# Patient Record
Sex: FEMALE | Race: WHITE | Employment: UNEMPLOYED | URBAN - METROPOLITAN AREA
[De-identification: names, ages, dates, MRNs, and addresses within clinical notes are randomized per-mention and may not be internally consistent; named-entity substitution may affect disease eponyms.]

---

## 2017-06-30 ENCOUNTER — OFFICE VISIT (OUTPATIENT)
Dept: FAMILY MEDICINE CLINIC | Age: 62
End: 2017-06-30

## 2017-06-30 VITALS
HEIGHT: 62 IN | BODY MASS INDEX: 25.76 KG/M2 | WEIGHT: 140 LBS | DIASTOLIC BLOOD PRESSURE: 84 MMHG | TEMPERATURE: 98 F | SYSTOLIC BLOOD PRESSURE: 126 MMHG

## 2017-06-30 DIAGNOSIS — R05.9 COUGH: ICD-10-CM

## 2017-06-30 DIAGNOSIS — R09.81 SINUS CONGESTION: Primary | ICD-10-CM

## 2017-06-30 PROCEDURE — 99213 OFFICE O/P EST LOW 20 MIN: CPT | Performed by: FAMILY MEDICINE

## 2017-06-30 RX ORDER — CEFDINIR 300 MG/1
300 CAPSULE ORAL 2 TIMES DAILY
Qty: 20 CAPSULE | Refills: 0 | Status: SHIPPED | OUTPATIENT
Start: 2017-06-30 | End: 2017-07-10

## 2017-09-26 ENCOUNTER — TELEPHONE (OUTPATIENT)
Dept: FAMILY MEDICINE CLINIC | Age: 62
End: 2017-09-26

## 2017-09-26 DIAGNOSIS — Z13.220 SCREENING, LIPID: Primary | ICD-10-CM

## 2017-09-26 DIAGNOSIS — Z13.1 SCREENING FOR DIABETES MELLITUS (DM): ICD-10-CM

## 2017-09-28 DIAGNOSIS — Z13.1 SCREENING FOR DIABETES MELLITUS (DM): ICD-10-CM

## 2017-09-28 DIAGNOSIS — Z13.220 SCREENING, LIPID: ICD-10-CM

## 2017-09-28 LAB
CHOLESTEROL, TOTAL: 178 MG/DL (ref 0–199)
GLUCOSE BLD-MCNC: 82 MG/DL (ref 70–99)
HDLC SERPL-MCNC: 71 MG/DL (ref 40–60)
LDL CHOLESTEROL CALCULATED: 95 MG/DL
TRIGL SERPL-MCNC: 58 MG/DL (ref 0–150)
VLDLC SERPL CALC-MCNC: 12 MG/DL

## 2017-10-10 ENCOUNTER — OFFICE VISIT (OUTPATIENT)
Dept: FAMILY MEDICINE CLINIC | Age: 62
End: 2017-10-10

## 2017-10-10 VITALS
BODY MASS INDEX: 25.1 KG/M2 | DIASTOLIC BLOOD PRESSURE: 86 MMHG | WEIGHT: 136.4 LBS | HEART RATE: 68 BPM | SYSTOLIC BLOOD PRESSURE: 128 MMHG | HEIGHT: 62 IN | TEMPERATURE: 98.4 F

## 2017-10-10 DIAGNOSIS — J45.909 UNCOMPLICATED ASTHMA, UNSPECIFIED ASTHMA SEVERITY: ICD-10-CM

## 2017-10-10 DIAGNOSIS — Z00.00 ROUTINE GENERAL MEDICAL EXAMINATION AT A HEALTH CARE FACILITY: Primary | ICD-10-CM

## 2017-10-10 DIAGNOSIS — E03.9 ACQUIRED HYPOTHYROIDISM: ICD-10-CM

## 2017-10-10 LAB — TSH REFLEX: 1.54 UIU/ML (ref 0.27–4.2)

## 2017-10-10 PROCEDURE — 99396 PREV VISIT EST AGE 40-64: CPT | Performed by: FAMILY MEDICINE

## 2017-10-10 RX ORDER — ALBUTEROL SULFATE 90 UG/1
2 AEROSOL, METERED RESPIRATORY (INHALATION) EVERY 6 HOURS PRN
Qty: 1 INHALER | Refills: 11 | Status: SHIPPED | OUTPATIENT
Start: 2017-10-10 | End: 2020-07-29 | Stop reason: SDUPTHER

## 2017-10-10 RX ORDER — MONTELUKAST SODIUM 10 MG/1
TABLET ORAL
Qty: 90 TABLET | Refills: 3 | Status: SHIPPED | OUTPATIENT
Start: 2017-10-10 | End: 2018-10-09 | Stop reason: SDUPTHER

## 2017-10-10 RX ORDER — FLUTICASONE PROPIONATE 50 MCG
1 SPRAY, SUSPENSION (ML) NASAL DAILY
Qty: 1 BOTTLE | Refills: 11 | Status: SHIPPED | OUTPATIENT
Start: 2017-10-10

## 2017-10-10 RX ORDER — LEVOTHYROXINE SODIUM 0.05 MG/1
TABLET ORAL
Qty: 90 TABLET | Refills: 3 | Status: SHIPPED | OUTPATIENT
Start: 2017-10-10 | End: 2018-10-09 | Stop reason: SDUPTHER

## 2017-10-10 ASSESSMENT — ENCOUNTER SYMPTOMS
NAUSEA: 0
COUGH: 0
EYE DISCHARGE: 0
EYE PAIN: 0
VOMITING: 0
DIARRHEA: 0
SHORTNESS OF BREATH: 0
EYE REDNESS: 0
ABDOMINAL PAIN: 0
SINUS PRESSURE: 0
CHEST TIGHTNESS: 0
CONSTIPATION: 0
RHINORRHEA: 0
BLOOD IN STOOL: 0
WHEEZING: 0

## 2017-10-10 ASSESSMENT — PATIENT HEALTH QUESTIONNAIRE - PHQ9
SUM OF ALL RESPONSES TO PHQ QUESTIONS 1-9: 0
2. FEELING DOWN, DEPRESSED OR HOPELESS: 0
SUM OF ALL RESPONSES TO PHQ9 QUESTIONS 1 & 2: 0
1. LITTLE INTEREST OR PLEASURE IN DOING THINGS: 0

## 2017-10-10 NOTE — PROGRESS NOTES
with Reflex     Standing Status:   Future     Standing Expiration Date:   10/10/2018     Declined hiv screen, flu shot and shingles vaccine

## 2017-10-10 NOTE — PATIENT INSTRUCTIONS
Please call your pharmacy if you need any refills of your medication(s). Please call our office at [phone] if you don't hear from us about your test results. Bring an accurate list of your medications with you at every appointment to ensure that we have the correct information. Our office hours are: Monday - Friday 7 am- 5 pm; Saturdays vary on doctor working.     Phone lines turn on at 8 am.

## 2017-10-20 ENCOUNTER — TELEPHONE (OUTPATIENT)
Dept: FAMILY MEDICINE CLINIC | Age: 62
End: 2017-10-20

## 2017-10-31 ENCOUNTER — HOSPITAL ENCOUNTER (OUTPATIENT)
Dept: MAMMOGRAPHY | Age: 62
Discharge: HOME | End: 2017-10-31
Attending: FAMILY MEDICINE | Admitting: FAMILY MEDICINE

## 2017-10-31 DIAGNOSIS — Z12.31 ENCOUNTER FOR SCREENING MAMMOGRAM FOR BREAST CANCER: ICD-10-CM

## 2018-04-18 ENCOUNTER — TELEPHONE (OUTPATIENT)
Dept: FAMILY MEDICINE CLINIC | Age: 63
End: 2018-04-18

## 2018-04-18 ENCOUNTER — OFFICE VISIT (OUTPATIENT)
Dept: FAMILY MEDICINE CLINIC | Age: 63
End: 2018-04-18

## 2018-04-18 VITALS
DIASTOLIC BLOOD PRESSURE: 80 MMHG | BODY MASS INDEX: 24.84 KG/M2 | HEIGHT: 62 IN | TEMPERATURE: 98.1 F | WEIGHT: 135 LBS | SYSTOLIC BLOOD PRESSURE: 126 MMHG

## 2018-04-18 DIAGNOSIS — J01.90 ACUTE NON-RECURRENT SINUSITIS, UNSPECIFIED LOCATION: Primary | ICD-10-CM

## 2018-04-18 DIAGNOSIS — J33.9 NASAL POLYP: ICD-10-CM

## 2018-04-18 PROCEDURE — 1036F TOBACCO NON-USER: CPT | Performed by: FAMILY MEDICINE

## 2018-04-18 PROCEDURE — 99213 OFFICE O/P EST LOW 20 MIN: CPT | Performed by: FAMILY MEDICINE

## 2018-04-18 PROCEDURE — G8420 CALC BMI NORM PARAMETERS: HCPCS | Performed by: FAMILY MEDICINE

## 2018-04-18 PROCEDURE — G8427 DOCREV CUR MEDS BY ELIG CLIN: HCPCS | Performed by: FAMILY MEDICINE

## 2018-04-18 PROCEDURE — 3017F COLORECTAL CA SCREEN DOC REV: CPT | Performed by: FAMILY MEDICINE

## 2018-04-18 PROCEDURE — 3014F SCREEN MAMMO DOC REV: CPT | Performed by: FAMILY MEDICINE

## 2018-04-18 RX ORDER — CEFUROXIME AXETIL 250 MG/1
250 TABLET ORAL 2 TIMES DAILY
Qty: 20 TABLET | Refills: 0 | Status: SHIPPED | OUTPATIENT
Start: 2018-04-18 | End: 2018-04-28

## 2018-04-18 RX ORDER — AMOXICILLIN AND CLAVULANATE POTASSIUM 500; 125 MG/1; MG/1
1 TABLET, FILM COATED ORAL 2 TIMES DAILY
Qty: 20 TABLET | Refills: 0 | Status: SHIPPED | OUTPATIENT
Start: 2018-04-18 | End: 2018-04-18

## 2018-10-09 RX ORDER — MONTELUKAST SODIUM 10 MG/1
TABLET ORAL
Qty: 90 TABLET | Refills: 0 | Status: SHIPPED | OUTPATIENT
Start: 2018-10-09 | End: 2019-01-21 | Stop reason: SDUPTHER

## 2018-10-09 RX ORDER — LEVOTHYROXINE SODIUM 0.05 MG/1
TABLET ORAL
Qty: 90 TABLET | Refills: 0 | Status: SHIPPED | OUTPATIENT
Start: 2018-10-09 | End: 2019-01-21 | Stop reason: SDUPTHER

## 2018-12-12 ENCOUNTER — HOSPITAL ENCOUNTER (OUTPATIENT)
Dept: WOMENS IMAGING | Age: 63
Discharge: HOME | End: 2018-12-12
Payer: COMMERCIAL

## 2018-12-12 DIAGNOSIS — Z12.39 BREAST CANCER SCREENING: ICD-10-CM

## 2018-12-12 PROCEDURE — 77067 SCR MAMMO BI INCL CAD: CPT

## 2019-01-17 RX ORDER — LEVOTHYROXINE SODIUM 0.05 MG/1
TABLET ORAL
Qty: 90 TABLET | Refills: 0 | OUTPATIENT
Start: 2019-01-17

## 2019-01-17 RX ORDER — MONTELUKAST SODIUM 10 MG/1
TABLET ORAL
Qty: 90 TABLET | Refills: 0 | OUTPATIENT
Start: 2019-01-17

## 2019-01-21 ENCOUNTER — OFFICE VISIT (OUTPATIENT)
Dept: FAMILY MEDICINE CLINIC | Age: 64
End: 2019-01-21
Payer: COMMERCIAL

## 2019-01-21 VITALS
HEART RATE: 68 BPM | SYSTOLIC BLOOD PRESSURE: 122 MMHG | HEIGHT: 62 IN | TEMPERATURE: 97.5 F | DIASTOLIC BLOOD PRESSURE: 74 MMHG | WEIGHT: 139.6 LBS | BODY MASS INDEX: 25.69 KG/M2

## 2019-01-21 DIAGNOSIS — E03.9 ACQUIRED HYPOTHYROIDISM: ICD-10-CM

## 2019-01-21 DIAGNOSIS — E03.9 ACQUIRED HYPOTHYROIDISM: Primary | ICD-10-CM

## 2019-01-21 LAB
T4 FREE: 1.2 NG/DL (ref 0.9–1.8)
TSH SERPL DL<=0.05 MIU/L-ACNC: 1.88 UIU/ML (ref 0.27–4.2)

## 2019-01-21 PROCEDURE — 99213 OFFICE O/P EST LOW 20 MIN: CPT | Performed by: FAMILY MEDICINE

## 2019-01-21 PROCEDURE — 1036F TOBACCO NON-USER: CPT | Performed by: FAMILY MEDICINE

## 2019-01-21 PROCEDURE — 3017F COLORECTAL CA SCREEN DOC REV: CPT | Performed by: FAMILY MEDICINE

## 2019-01-21 PROCEDURE — G8427 DOCREV CUR MEDS BY ELIG CLIN: HCPCS | Performed by: FAMILY MEDICINE

## 2019-01-21 PROCEDURE — G8484 FLU IMMUNIZE NO ADMIN: HCPCS | Performed by: FAMILY MEDICINE

## 2019-01-21 PROCEDURE — G8419 CALC BMI OUT NRM PARAM NOF/U: HCPCS | Performed by: FAMILY MEDICINE

## 2019-01-21 RX ORDER — MONTELUKAST SODIUM 10 MG/1
10 TABLET ORAL NIGHTLY
Qty: 90 TABLET | Refills: 2 | Status: SHIPPED | OUTPATIENT
Start: 2019-01-21 | End: 2019-11-30 | Stop reason: SDUPTHER

## 2019-01-21 RX ORDER — LEVOTHYROXINE SODIUM 0.05 MG/1
50 TABLET ORAL DAILY
Qty: 90 TABLET | Refills: 2 | Status: SHIPPED | OUTPATIENT
Start: 2019-01-21 | End: 2019-10-09 | Stop reason: SDUPTHER

## 2019-01-21 ASSESSMENT — ENCOUNTER SYMPTOMS
CONSTIPATION: 0
NAUSEA: 0
ABDOMINAL PAIN: 0
VOMITING: 0
DIARRHEA: 0
BLOOD IN STOOL: 0
ANAL BLEEDING: 0
COUGH: 0
SHORTNESS OF BREATH: 0
TROUBLE SWALLOWING: 0
SORE THROAT: 0
VOICE CHANGE: 0

## 2019-01-21 ASSESSMENT — PATIENT HEALTH QUESTIONNAIRE - PHQ9
2. FEELING DOWN, DEPRESSED OR HOPELESS: 0
SUM OF ALL RESPONSES TO PHQ QUESTIONS 1-9: 0
SUM OF ALL RESPONSES TO PHQ9 QUESTIONS 1 & 2: 0
SUM OF ALL RESPONSES TO PHQ QUESTIONS 1-9: 0
1. LITTLE INTEREST OR PLEASURE IN DOING THINGS: 0

## 2019-10-10 RX ORDER — LEVOTHYROXINE SODIUM 0.05 MG/1
TABLET ORAL
Qty: 90 TABLET | Refills: 1 | Status: SHIPPED | OUTPATIENT
Start: 2019-10-10 | End: 2020-04-03

## 2020-04-03 RX ORDER — LEVOTHYROXINE SODIUM 0.05 MG/1
TABLET ORAL
Qty: 30 TABLET | Refills: 0 | Status: SHIPPED | OUTPATIENT
Start: 2020-04-03 | End: 2020-05-04

## 2020-05-04 RX ORDER — LEVOTHYROXINE SODIUM 0.05 MG/1
TABLET ORAL
Qty: 30 TABLET | Refills: 0 | Status: SHIPPED | OUTPATIENT
Start: 2020-05-04 | End: 2020-06-10

## 2020-06-01 RX ORDER — MONTELUKAST SODIUM 10 MG/1
TABLET ORAL
Qty: 90 TABLET | Refills: 0 | Status: SHIPPED | OUTPATIENT
Start: 2020-06-01 | End: 2020-07-29 | Stop reason: SDUPTHER

## 2020-06-04 RX ORDER — LEVOTHYROXINE SODIUM 0.05 MG/1
TABLET ORAL
Qty: 30 TABLET | Refills: 0 | OUTPATIENT
Start: 2020-06-04

## 2020-06-29 ENCOUNTER — TELEPHONE (OUTPATIENT)
Dept: FAMILY MEDICINE CLINIC | Age: 65
End: 2020-06-29

## 2020-06-30 NOTE — TELEPHONE ENCOUNTER
Patient scheduled for cpe on 7/29 and asking to have labs drawn before appt. She is asking for full blood workup.

## 2020-07-08 RX ORDER — LEVOTHYROXINE SODIUM 0.05 MG/1
TABLET ORAL
Qty: 30 TABLET | Refills: 1 | Status: SHIPPED | OUTPATIENT
Start: 2020-07-08 | End: 2020-07-29 | Stop reason: SDUPTHER

## 2020-07-23 DIAGNOSIS — Q21.10 ASD (ATRIAL SEPTAL DEFECT): ICD-10-CM

## 2020-07-23 DIAGNOSIS — Z13.220 SCREENING CHOLESTEROL LEVEL: ICD-10-CM

## 2020-07-23 DIAGNOSIS — E03.9 ACQUIRED HYPOTHYROIDISM: ICD-10-CM

## 2020-07-23 DIAGNOSIS — R53.83 FATIGUE, UNSPECIFIED TYPE: ICD-10-CM

## 2020-07-23 LAB
A/G RATIO: 1.6 (ref 1.1–2.2)
ALBUMIN SERPL-MCNC: 4.1 G/DL (ref 3.4–5)
ALP BLD-CCNC: 63 U/L (ref 40–129)
ALT SERPL-CCNC: 18 U/L (ref 10–40)
ANION GAP SERPL CALCULATED.3IONS-SCNC: 13 MMOL/L (ref 3–16)
AST SERPL-CCNC: 27 U/L (ref 15–37)
BILIRUB SERPL-MCNC: 0.3 MG/DL (ref 0–1)
BUN BLDV-MCNC: 16 MG/DL (ref 7–20)
CALCIUM SERPL-MCNC: 9.9 MG/DL (ref 8.3–10.6)
CHLORIDE BLD-SCNC: 106 MMOL/L (ref 99–110)
CHOLESTEROL, TOTAL: 166 MG/DL (ref 0–199)
CO2: 25 MMOL/L (ref 21–32)
CREAT SERPL-MCNC: 0.7 MG/DL (ref 0.6–1.2)
GFR AFRICAN AMERICAN: >60
GFR NON-AFRICAN AMERICAN: >60
GLOBULIN: 2.5 G/DL
GLUCOSE BLD-MCNC: 86 MG/DL (ref 70–99)
HCT VFR BLD CALC: 40.1 % (ref 36–48)
HDLC SERPL-MCNC: 68 MG/DL (ref 40–60)
HEMOGLOBIN: 13.3 G/DL (ref 12–16)
LDL CHOLESTEROL CALCULATED: 87 MG/DL
MCH RBC QN AUTO: 30.4 PG (ref 26–34)
MCHC RBC AUTO-ENTMCNC: 33.1 G/DL (ref 31–36)
MCV RBC AUTO: 91.9 FL (ref 80–100)
PDW BLD-RTO: 14 % (ref 12.4–15.4)
PLATELET # BLD: 207 K/UL (ref 135–450)
PMV BLD AUTO: 9.7 FL (ref 5–10.5)
POTASSIUM SERPL-SCNC: 3.8 MMOL/L (ref 3.5–5.1)
RBC # BLD: 4.37 M/UL (ref 4–5.2)
SODIUM BLD-SCNC: 144 MMOL/L (ref 136–145)
TOTAL PROTEIN: 6.6 G/DL (ref 6.4–8.2)
TRIGL SERPL-MCNC: 57 MG/DL (ref 0–150)
TSH SERPL DL<=0.05 MIU/L-ACNC: 2.26 UIU/ML (ref 0.27–4.2)
VLDLC SERPL CALC-MCNC: 11 MG/DL
WBC # BLD: 5.1 K/UL (ref 4–11)

## 2020-07-29 ENCOUNTER — OFFICE VISIT (OUTPATIENT)
Dept: FAMILY MEDICINE CLINIC | Age: 65
End: 2020-07-29
Payer: COMMERCIAL

## 2020-07-29 VITALS
TEMPERATURE: 97.9 F | WEIGHT: 142.2 LBS | DIASTOLIC BLOOD PRESSURE: 80 MMHG | OXYGEN SATURATION: 98 % | SYSTOLIC BLOOD PRESSURE: 142 MMHG | HEIGHT: 58 IN | BODY MASS INDEX: 29.85 KG/M2 | HEART RATE: 66 BPM

## 2020-07-29 PROBLEM — Z86.79 H/O TACHYCARDIA-BRADYCARDIA SYNDROME: Status: ACTIVE | Noted: 2018-05-01

## 2020-07-29 PROBLEM — Z90.710 S/P HYSTERECTOMY: Status: ACTIVE | Noted: 2020-07-29

## 2020-07-29 PROBLEM — I48.92 ATRIAL FLUTTER (HCC): Status: ACTIVE | Noted: 2017-04-25

## 2020-07-29 PROBLEM — Z95.0 S/P CARDIAC PACEMAKER PROCEDURE: Status: RESOLVED | Noted: 2019-08-19 | Resolved: 2020-07-29

## 2020-07-29 PROBLEM — Z95.0 S/P CARDIAC PACEMAKER PROCEDURE: Status: ACTIVE | Noted: 2019-08-19

## 2020-07-29 PROBLEM — R94.31 ABNORMAL ECG: Status: ACTIVE | Noted: 2017-04-25

## 2020-07-29 PROBLEM — I49.5 SSS (SICK SINUS SYNDROME) (HCC): Status: ACTIVE | Noted: 2020-07-29

## 2020-07-29 PROCEDURE — 3017F COLORECTAL CA SCREEN DOC REV: CPT | Performed by: INTERNAL MEDICINE

## 2020-07-29 PROCEDURE — 1036F TOBACCO NON-USER: CPT | Performed by: INTERNAL MEDICINE

## 2020-07-29 PROCEDURE — 99204 OFFICE O/P NEW MOD 45 MIN: CPT | Performed by: INTERNAL MEDICINE

## 2020-07-29 PROCEDURE — G8427 DOCREV CUR MEDS BY ELIG CLIN: HCPCS | Performed by: INTERNAL MEDICINE

## 2020-07-29 PROCEDURE — G8419 CALC BMI OUT NRM PARAM NOF/U: HCPCS | Performed by: INTERNAL MEDICINE

## 2020-07-29 RX ORDER — MONTELUKAST SODIUM 10 MG/1
TABLET ORAL
Qty: 90 TABLET | Refills: 3 | Status: SHIPPED | OUTPATIENT
Start: 2020-07-29 | End: 2021-07-26

## 2020-07-29 RX ORDER — ALBUTEROL SULFATE 90 UG/1
2 AEROSOL, METERED RESPIRATORY (INHALATION) EVERY 6 HOURS PRN
Qty: 1 INHALER | Refills: 3 | Status: SHIPPED | OUTPATIENT
Start: 2020-07-29 | End: 2021-12-01 | Stop reason: SDUPTHER

## 2020-07-29 RX ORDER — NYSTATIN 500000 [USP'U]/1
500000 TABLET, COATED ORAL 2 TIMES DAILY
Qty: 60 TABLET | Refills: 1 | Status: SHIPPED | OUTPATIENT
Start: 2020-07-29 | End: 2022-02-22 | Stop reason: SDUPTHER

## 2020-07-29 RX ORDER — LEVOTHYROXINE SODIUM 0.05 MG/1
50 TABLET ORAL DAILY
Qty: 90 TABLET | Refills: 1 | Status: SHIPPED | OUTPATIENT
Start: 2020-07-29 | End: 2021-02-21

## 2020-07-29 ASSESSMENT — ENCOUNTER SYMPTOMS
BLOOD IN STOOL: 0
BACK PAIN: 1
NAUSEA: 0
DIARRHEA: 0
SHORTNESS OF BREATH: 0
VOMITING: 0
SINUS PRESSURE: 0
ABDOMINAL PAIN: 0
CONSTIPATION: 1
COUGH: 0

## 2020-07-29 ASSESSMENT — PATIENT HEALTH QUESTIONNAIRE - PHQ9
2. FEELING DOWN, DEPRESSED OR HOPELESS: 0
SUM OF ALL RESPONSES TO PHQ QUESTIONS 1-9: 0
SUM OF ALL RESPONSES TO PHQ QUESTIONS 1-9: 0
1. LITTLE INTEREST OR PLEASURE IN DOING THINGS: 0
SUM OF ALL RESPONSES TO PHQ9 QUESTIONS 1 & 2: 0

## 2020-07-29 NOTE — PROGRESS NOTES
by mouth 2 times daily. No current facility-administered medications for this visit.           Social History     Socioeconomic History    Marital status:      Spouse name: Not on file    Number of children: Not on file    Years of education: Not on file    Highest education level: Not on file   Occupational History    Not on file   Social Needs    Financial resource strain: Not on file    Food insecurity     Worry: Not on file     Inability: Not on file    Transportation needs     Medical: Not on file     Non-medical: Not on file   Tobacco Use    Smoking status: Former Smoker     Packs/day: 0.50     Years: 8.00     Pack years: 4.00     Types: Cigarettes     Start date: 1972     Last attempt to quit: 1980     Years since quittin.6    Smokeless tobacco: Never Used   Substance and Sexual Activity    Alcohol use: Yes     Comment: rare, occ glass wine    Drug use: Never    Sexual activity: Not Currently     Partners: Male   Lifestyle    Physical activity     Days per week: Not on file     Minutes per session: Not on file    Stress: Not on file   Relationships    Social connections     Talks on phone: Not on file     Gets together: Not on file     Attends Alevism service: Not on file     Active member of club or organization: Not on file     Attends meetings of clubs or organizations: Not on file     Relationship status: Not on file    Intimate partner violence     Fear of current or ex partner: Not on file     Emotionally abused: Not on file     Physically abused: Not on file     Forced sexual activity: Not on file   Other Topics Concern    Not on file   Social History Narrative    Not on file      Past Medical History:   Diagnosis Date    Asthma     Atrial septal defect     Constipation     Environmental allergies     Hyperlipidemia     borderline    Pacemaker     Reflux      Past Surgical History:   Procedure Laterality Date    CARDIAC SURGERY      atrial septal sleep disturbance. Negative for dysphoric mood. OBJECTIVE:  BP (!) 142/80   Pulse 66   Temp 97.9 °F (36.6 °C) (Temporal)   Ht 4' 9.75\" (1.467 m)   Wt 142 lb 3.2 oz (64.5 kg)   LMP  (LMP Unknown)   SpO2 98%   Breastfeeding No   BMI 29.98 kg/m²      Body mass index is 29.98 kg/m². Physical Exam  Constitutional:       General: She is not in acute distress. Appearance: Normal appearance. She is well-developed. She is not diaphoretic. HENT:      Head: Normocephalic and atraumatic. Right Ear: Tympanic membrane and ear canal normal.      Left Ear: Tympanic membrane and ear canal normal.      Nose: Nose normal.      Mouth/Throat:      Pharynx: Oropharynx is clear. Eyes:      Conjunctiva/sclera: Conjunctivae normal.   Neck:      Musculoskeletal: Neck supple. Muscular tenderness (Posterior) present. Thyroid: No thyroid mass or thyromegaly. Vascular: No carotid bruit. Cardiovascular:      Rate and Rhythm: Normal rate and regular rhythm. Heart sounds: Normal heart sounds. No murmur. No friction rub. No gallop. Pulmonary:      Effort: Pulmonary effort is normal. No tachypnea or accessory muscle usage. Breath sounds: Normal breath sounds. No decreased breath sounds. Chest:      Chest wall: No tenderness. Abdominal:      General: There is no distension. Palpations: Abdomen is soft. Tenderness: There is no abdominal tenderness. Comments: No hepatosplenomegaly   Musculoskeletal:         General: Tenderness (Across low back. No tenderness in her left groin or trochanteric hip area) present. No swelling. Lymphadenopathy:      Cervical: No cervical adenopathy. Upper Body:      Right upper body: No supraclavicular adenopathy. Left upper body: No supraclavicular adenopathy. Skin:     General: Skin is warm and dry. Findings: Rash (Circular lesions on hands) present. Neurological:      General: No focal deficit present.       Mental Status: She is alert. Gait: Gait normal.   Psychiatric:         Speech: Speech normal.         Behavior: Behavior normal.         Thought Content: Thought content normal.         ASSESSMENT/PLAN:    Dion Dean was seen today for annual exam.    Diagnoses and all orders for this visit:    Left groin pain  -     XR HIP 2-3 VW W PELVIS LEFT; Future    Sciatica, left side  -     XR LUMBAR SPINE (2-3 VIEWS); Future    Neck pain  -     XR CERVICAL SPINE (2-3 VIEWS); Future    Neuropathy  -     XR CERVICAL SPINE (2-3 VIEWS); Future    ASD (atrial septal defect)    Acquired hypothyroidism  -     levothyroxine (SYNTHROID) 50 MCG tablet; Take 1 tablet by mouth Daily    SSS (sick sinus syndrome) (HCC)    Mild intermittent asthma without complication  -     montelukast (SINGULAIR) 10 MG tablet; TAKE ONE TABLET BY MOUTH NIGHTLY  -     albuterol sulfate  (90 Base) MCG/ACT inhaler; Inhale 2 puffs into the lungs every 6 hours as needed for Wheezing or Shortness of Breath    Breast cancer screening  -     XOCHITL DIGITAL SCREEN W OR WO CAD BILATERAL; Future    Rash question fungal medicine helped previously. If continues to call  -     nystatin (MYCOSTATIN) 104856 units TABS; Take 1 tablet by mouth 2 times daily        Orders Placed This Encounter   Medications    nystatin (MYCOSTATIN) 262050 units TABS     Sig: Take 1 tablet by mouth 2 times daily     Dispense:  60 tablet     Refill:  1    montelukast (SINGULAIR) 10 MG tablet     Sig: TAKE ONE TABLET BY MOUTH NIGHTLY     Dispense:  90 tablet     Refill:  3    albuterol sulfate  (90 Base) MCG/ACT inhaler     Sig: Inhale 2 puffs into the lungs every 6 hours as needed for Wheezing or Shortness of Breath     Dispense:  1 Inhaler     Refill:  3    levothyroxine (SYNTHROID) 50 MCG tablet     Sig: Take 1 tablet by mouth Daily     Dispense:  90 tablet     Refill:  1        Return in about 1 year (around 7/29/2021), or if symptoms worsen or fail to improve.      There are no Patient Instructions on file for this visit.

## 2020-07-29 NOTE — LETTER
[employer] OF [facility]  [address] [facility] [address]  Phone: [phone]  Fax: [phone]    Jerome Guillaume MD        July 29, 2020     Patient: Yeni Rosa   YOB: 1955   Date of Visit: 7/29/2020       To Whom It May Concern: It is my medical opinion that Xander Rodríguez requires a disability parking placard for the following reasons:  She has limited walking ability due to an orthopedic condition. Duration of need: permanent    If you have any questions or concerns, please don't hesitate to call.     Sincerely,        Jerome Guillaume MD

## 2020-07-31 ENCOUNTER — HOSPITAL ENCOUNTER (OUTPATIENT)
Dept: GENERAL RADIOLOGY | Age: 65
Discharge: HOME | End: 2020-07-31
Payer: COMMERCIAL

## 2020-07-31 ENCOUNTER — HOSPITAL ENCOUNTER (OUTPATIENT)
Age: 65
Discharge: HOME | End: 2020-07-31
Payer: COMMERCIAL

## 2020-07-31 PROCEDURE — 72100 X-RAY EXAM L-S SPINE 2/3 VWS: CPT

## 2020-07-31 PROCEDURE — 72040 X-RAY EXAM NECK SPINE 2-3 VW: CPT

## 2020-07-31 PROCEDURE — 73502 X-RAY EXAM HIP UNI 2-3 VIEWS: CPT

## 2020-08-12 ENCOUNTER — OFFICE VISIT (OUTPATIENT)
Dept: ORTHOPEDIC SURGERY | Age: 65
End: 2020-08-12
Payer: COMMERCIAL

## 2020-08-12 VITALS — WEIGHT: 137 LBS | HEIGHT: 57 IN | TEMPERATURE: 97.7 F | BODY MASS INDEX: 29.56 KG/M2

## 2020-08-12 PROCEDURE — G8419 CALC BMI OUT NRM PARAM NOF/U: HCPCS | Performed by: PHYSICIAN ASSISTANT

## 2020-08-12 PROCEDURE — G8427 DOCREV CUR MEDS BY ELIG CLIN: HCPCS | Performed by: PHYSICIAN ASSISTANT

## 2020-08-12 PROCEDURE — 99204 OFFICE O/P NEW MOD 45 MIN: CPT | Performed by: PHYSICIAN ASSISTANT

## 2020-08-12 NOTE — PROGRESS NOTES
History of present illness:   Ms. Gordon Zuleta  is a pleasant 62 y.o. female with a PMH of right LISA, pacemaker, HLD, atrial septal defect, and asthma kindly referred by Dr. Hilary Yang for consultation regarding her neck, left arm, LBP, and left leg pain. She states her pain began insidiously about several years ago. Her pain has steadily increased since then. She rates her back pain 8/10 and leg pain 8/10. She describes the pain as aching, constant, and at times sharp that is worse with all positions and denies alleviating factors. The leg pain radiates down the posterior aspect of her left leg to her ankle. She also notes left hip and anterior groin pain. She admits numbness and tingling in her left leg. She admits generlized weakness of her left leg and denies bowel or bladder dysfunction. Established with urologist for botox injections for incontinence. She has tried multiple epidural injections in the distant past with good, temporary relief (9 years ago through [employer]). Has tried PT in distant past without relief. She takes Advil PM.    Also notes constant, aching neck pain along with left UE numbness and tingling. States her neck pain is worse with prolonged sitting and any cervical motion. Notes \"cracking\" in neck. Denies radiating arm pain. Numbness and tingling extends from her elbow into her left thumb and index finger. Denies loss of fine motor skills or gait disturbance. Has not had ESIs or PT for her neck. Past medical history:  Her past medical history has been reviewed and is significant for pacemaker, HLD, atrial septal defect, and asthma    Past surgical history:  Her past surgical history has been reviewed and is non-contributory to her present illness. Her medications and allergies were reviewed.     Social history:  Her social history has been reviewed and she is a former smoker quitting in 1980    Current Medication:  Current Outpatient Medications   Medication Sig Dispense Refill  Ibuprofen-diphenhydrAMINE Cit (ADVIL PM PO) Take by mouth nightly as needed (sleep)      nystatin (MYCOSTATIN) 331327 units TABS Take 1 tablet by mouth 2 times daily 60 tablet 1    montelukast (SINGULAIR) 10 MG tablet TAKE ONE TABLET BY MOUTH NIGHTLY 90 tablet 3    albuterol sulfate  (90 Base) MCG/ACT inhaler Inhale 2 puffs into the lungs every 6 hours as needed for Wheezing or Shortness of Breath 1 Inhaler 3    levothyroxine (SYNTHROID) 50 MCG tablet Take 1 tablet by mouth Daily 90 tablet 1    Ospemifene 60 MG TABS Take by mouth daily       estradiol (ESTRING) 2 MG vaginal ring Place 2 mg vaginally once Follow package directions.  fluticasone (FLONASE) 50 MCG/ACT nasal spray 1 spray by Nasal route daily 1 Bottle 11    Probiotic Product (PROBIOTIC DAILY PO) Take by mouth daily       Multiple Vitamins-Minerals (THERAPEUTIC MULTIVITAMIN-MINERALS) tablet Take 1 tablet by mouth daily.  Calcium Carbonate-Vitamin D (CALCIUM 600+D3 PO) Take 1 tablet by mouth 2 times daily       Biotin 2500 MCG CAPS Take  by mouth 2 times daily. No current facility-administered medications for this visit. Review of symptoms:  Patient's review of symptoms was reviewed and is significant for back pain and negative for recent weight loss, fatigue, chills, visual disturbances, blood in stool or urine, recent infection, chest pain, or shortness of breath. All other ROS were negative. Physical examination:  Ms. William Crawford's most recent vitals:  Vitals  Temp: 97.7 °F (36.5 °C)  Temp Source: Temporal  Height: 4' 9\" (144.8 cm)  Weight: 137 lb (62.1 kg)  Body mass index is 29.65 kg/m². General exam:  She is well-developed and well-nourished, is in obvious pain and alert and oriented to person, place, and time. She demonstrates appropriate mood and affect. Her skin is warm and dry. Her gait is normal and she walks heel to toe without limp or instability.      Back:  She stands with slight lumbar flexion. Her lumbar flexion, extension and lateral bending are moderately reduced with pain. She has mild tenderness over her lumbar spine without obvious muscle spasm. The skin over her lumbar spine is normal without a surgical scar. Lower extremities:  She has 5/5 motor strength of bilateral lower extremities. She has a negative straight leg raise, bilaterally. Deep tendon reflexes at knees and achilles are 2+. Sensation is intact to light touch L3 to S1 bilaterally. She has no clonus. Left hip ROM mild pain. Upper extremities:   She walks with a normal gait. Her cervical flexion, extension, and axial rotation are moderately reduced with pain. Her skin is warm and dry. Her has no tenderness over her cervical spine and no obvious muscle spasm. The skin over her cervical spine is normal without surgical scar. She has 5/5 strength of her interosseous muscles, wrist dorsiflexors and volarflexors, biceps, triceps, deltoids, and internal and external rotators of her shoulders, bilaterally. Her biceps, triceps, bracheoradialis, quadriceps and achilles reflexes are 2+, bilaterally. Sensation is intact to light touchfrom C6 to C8. She has no clonus and negative Souza's bilaterally. Imaging:  I reviewed AP and lateral xray images of her lumbar spine from 7/31/20. They note severe degenerative changes throughout without evidence of fracture or instability. Mild degenerative scoliosis. Reviewed AP and lateral xray images of her cervical spine from 7/31/20 which show severe degenerative disc changes, most notable C3-4, C4-5, and C6-7. Left greater than right facet arthropathy. No obvious sign of fracture. Left hip xrays from 7/31/20 note moderate to severe OA without fracture. Assessment:  Cervical DDD with radiculopathy  Lumbar DDD with radiculopathy   Left hip OA    Plan:  We discussed treatment options including observation, additional physical therapy, epidural injections and additional imaging. Patient unable to have MRI due to pacemaker, so will proceed with CT myelogram for her cervical and lumbar spine. Would like to have injections pending results. Would like to keep surgery as a last resort.    Advised patient to see hip specialist for her left groin pain and moderate-severe arthritis and she would like to address her back first.     Chaparro Dupont PA-C I will SWITCH the dose or number of times a day I take the medications listed below when I get home from the hospital:  None

## 2020-08-13 ENCOUNTER — TELEPHONE (OUTPATIENT)
Dept: ORTHOPEDIC SURGERY | Age: 65
End: 2020-08-13

## 2020-08-24 ENCOUNTER — TELEPHONE (OUTPATIENT)
Dept: ORTHOPEDIC SURGERY | Age: 65
End: 2020-08-24

## 2020-08-24 NOTE — TELEPHONE ENCOUNTER
Lab orders placed in chart and patient notified by leaving a VM asked patient to call back if she has any questions.

## 2020-09-01 DIAGNOSIS — M51.36 DDD (DEGENERATIVE DISC DISEASE), LUMBAR: ICD-10-CM

## 2020-09-01 DIAGNOSIS — M54.16 LUMBAR RADICULOPATHY: ICD-10-CM

## 2020-09-01 DIAGNOSIS — M54.12 CERVICAL RADICULOPATHY: ICD-10-CM

## 2020-09-01 DIAGNOSIS — M50.30 DEGENERATIVE DISC DISEASE, CERVICAL: ICD-10-CM

## 2020-09-01 LAB
BUN BLDV-MCNC: 22 MG/DL (ref 7–20)
CREAT SERPL-MCNC: 0.7 MG/DL (ref 0.6–1.2)
GFR AFRICAN AMERICAN: >60
GFR NON-AFRICAN AMERICAN: >60

## 2020-09-03 ENCOUNTER — OFFICE VISIT (OUTPATIENT)
Dept: PRIMARY CARE CLINIC | Age: 65
End: 2020-09-03
Payer: COMMERCIAL

## 2020-09-03 ENCOUNTER — TELEPHONE (OUTPATIENT)
Dept: ORTHOPEDIC SURGERY | Age: 65
End: 2020-09-03

## 2020-09-03 PROCEDURE — 99211 OFF/OP EST MAY X REQ PHY/QHP: CPT | Performed by: NURSE PRACTITIONER

## 2020-09-03 PROCEDURE — G8428 CUR MEDS NOT DOCUMENT: HCPCS | Performed by: NURSE PRACTITIONER

## 2020-09-03 PROCEDURE — G8419 CALC BMI OUT NRM PARAM NOF/U: HCPCS | Performed by: NURSE PRACTITIONER

## 2020-09-03 NOTE — PATIENT INSTRUCTIONS

## 2020-09-03 NOTE — TELEPHONE ENCOUNTER
Called and spoke with the patient she was told when she scheduled her CT that she would have to have a Coag test done. But was told two different times she needed to have it done. One before the exam and then the other was it to be done the same day. I have pended the orders and will call to tell the patient that she can go before hand or if she cant then they will do it the day of the exam.     Stefan Cruz could you sign these orders?

## 2020-09-03 NOTE — PROGRESS NOTES
Gaynel Daubs received a viral test for COVID-19. They were educated on isolation and quarantine as appropriate. For any symptoms, they were directed to seek care from their PCP, given contact information to establish with a doctor, directed to an urgent care or the emergency room.

## 2020-09-04 LAB — SARS-COV-2, NAA: NOT DETECTED

## 2020-09-09 ENCOUNTER — HOSPITAL ENCOUNTER (OUTPATIENT)
Dept: GENERAL RADIOLOGY | Age: 65
Discharge: HOME | End: 2020-09-09
Payer: COMMERCIAL

## 2020-09-09 ENCOUNTER — APPOINTMENT (OUTPATIENT)
Dept: GENERAL RADIOLOGY | Age: 65
End: 2020-09-09
Payer: COMMERCIAL

## 2020-09-09 ENCOUNTER — HOSPITAL ENCOUNTER (OUTPATIENT)
Dept: CT IMAGING | Age: 65
Discharge: HOME | End: 2020-09-09
Payer: COMMERCIAL

## 2020-09-09 ENCOUNTER — APPOINTMENT (OUTPATIENT)
Dept: CT IMAGING | Age: 65
End: 2020-09-09
Payer: COMMERCIAL

## 2020-09-09 VITALS
DIASTOLIC BLOOD PRESSURE: 90 MMHG | HEART RATE: 61 BPM | TEMPERATURE: 97 F | OXYGEN SATURATION: 98 % | RESPIRATION RATE: 16 BRPM | HEIGHT: 57 IN | BODY MASS INDEX: 30.63 KG/M2 | WEIGHT: 141.98 LBS | SYSTOLIC BLOOD PRESSURE: 142 MMHG

## 2020-09-09 LAB
APTT: 29.4 SEC (ref 24.2–36.2)
INR BLD: 0.9 (ref 0.86–1.14)
PROTHROMBIN TIME: 10.4 SEC (ref 10–13.2)

## 2020-09-09 PROCEDURE — 2709999900 FL MYELOGRAM CERVICAL S&I

## 2020-09-09 PROCEDURE — 7100000011 HC PHASE II RECOVERY - ADDTL 15 MIN

## 2020-09-09 PROCEDURE — 7100000010 HC PHASE II RECOVERY - FIRST 15 MIN

## 2020-09-09 PROCEDURE — 6360000004 HC RX CONTRAST MEDICATION: Performed by: PHYSICIAN ASSISTANT

## 2020-09-09 PROCEDURE — 72132 CT LUMBAR SPINE W/DYE: CPT

## 2020-09-09 PROCEDURE — 72126 CT NECK SPINE W/DYE: CPT

## 2020-09-09 PROCEDURE — 72265 MYELOGRAPHY L-S SPINE: CPT

## 2020-09-09 PROCEDURE — 85610 PROTHROMBIN TIME: CPT

## 2020-09-09 PROCEDURE — 36415 COLL VENOUS BLD VENIPUNCTURE: CPT

## 2020-09-09 PROCEDURE — 85730 THROMBOPLASTIN TIME PARTIAL: CPT

## 2020-09-09 RX ADMIN — IOPAMIDOL 11 ML: 408 INJECTION, SOLUTION INTRATHECAL at 11:27

## 2020-09-09 ASSESSMENT — PAIN SCALES - GENERAL
PAINLEVEL_OUTOF10: 0

## 2020-09-09 ASSESSMENT — PAIN - FUNCTIONAL ASSESSMENT
PAIN_FUNCTIONAL_ASSESSMENT: 0-10
PAIN_FUNCTIONAL_ASSESSMENT: PREVENTS OR INTERFERES SOME ACTIVE ACTIVITIES AND ADLS

## 2020-09-09 ASSESSMENT — PAIN DESCRIPTION - DESCRIPTORS: DESCRIPTORS: ACHING;CONSTANT

## 2020-09-09 NOTE — PROGRESS NOTES
Alert and oriented. No c/o. Vss. dressings and cir checks remain the same. Dressing sites are without swelling or bruising. Tolerated sitting up and po fluids well. Verbalized understanding of discharge instructions.

## 2020-09-09 NOTE — PROGRESS NOTES
Patient rolled to left side, supine. Dressings and cir checks remain the same. Vss. No c/o. . taking po fluids well.  at bedside. Patient and  verbalized understanding of discharge instructions.

## 2020-09-09 NOTE — PROGRESS NOTES
From IR. alert and oriented. Denies pain but says her lower back feels a little sore where the procedure was done. Dressings are clean dry intact. Areas without swelling or bruising. Toes are warm, move well, layla well. Pedal pulses are regular.  in room. Patient supine.

## 2020-09-11 ENCOUNTER — TELEPHONE (OUTPATIENT)
Dept: ORTHOPEDIC SURGERY | Age: 65
End: 2020-09-11

## 2020-09-11 NOTE — TELEPHONE ENCOUNTER
Attempted to call patient to go over her CT results. However patient wasn't available so I left a VM for patient to call back. When she returns my call please see if I am available to speak with via IM and if I am not place a message here and I will call her back.  - Thanks

## 2020-09-14 NOTE — TELEPHONE ENCOUNTER
Please call patient with cervical and lumbar CT myelogram results. Cervical shows significant degenerative disc changes and moderate to severe foraminal narrowing throughout. Mild central canal stenosis - would recommend PT and/or ESIs for her neck     Lumbar shows significant degenerative disc changes with moderate central canal stenosis and moderate to severe foraminal narrowing at L4-5 and L5-S1. Can try ESIs as discussed in office. Thanks.

## 2020-10-22 ENCOUNTER — APPOINTMENT (OUTPATIENT)
Dept: GENERAL RADIOLOGY | Age: 65
End: 2020-10-22
Attending: PHYSICAL MEDICINE & REHABILITATION
Payer: COMMERCIAL

## 2020-10-22 ENCOUNTER — HOSPITAL ENCOUNTER (OUTPATIENT)
Age: 65
Setting detail: OUTPATIENT SURGERY
Discharge: HOME | End: 2020-10-22
Attending: PHYSICAL MEDICINE & REHABILITATION | Admitting: PHYSICAL MEDICINE & REHABILITATION
Payer: COMMERCIAL

## 2020-10-22 VITALS
TEMPERATURE: 97.7 F | BODY MASS INDEX: 30.2 KG/M2 | HEART RATE: 66 BPM | OXYGEN SATURATION: 100 % | RESPIRATION RATE: 16 BRPM | DIASTOLIC BLOOD PRESSURE: 82 MMHG | WEIGHT: 140 LBS | HEIGHT: 57 IN | SYSTOLIC BLOOD PRESSURE: 143 MMHG

## 2020-10-22 PROCEDURE — 3610000058 HC PAIN LEVEL 5 BASE (NON-OR): Performed by: PHYSICAL MEDICINE & REHABILITATION

## 2020-10-22 PROCEDURE — 2709999900 HC NON-CHARGEABLE SUPPLY: Performed by: PHYSICAL MEDICINE & REHABILITATION

## 2020-10-22 PROCEDURE — 3209999900 FLUORO FOR SURGICAL PROCEDURES

## 2020-10-22 PROCEDURE — 2500000003 HC RX 250 WO HCPCS: Performed by: PHYSICAL MEDICINE & REHABILITATION

## 2020-10-22 PROCEDURE — 3610000059 HC PAIN LEVEL 5 ADDL 15 MIN (NON-OR): Performed by: PHYSICAL MEDICINE & REHABILITATION

## 2020-10-22 RX ORDER — BUPIVACAINE HYDROCHLORIDE 5 MG/ML
INJECTION, SOLUTION EPIDURAL; INTRACAUDAL
Status: COMPLETED | OUTPATIENT
Start: 2020-10-22 | End: 2020-10-22

## 2020-10-22 RX ORDER — LIDOCAINE HYDROCHLORIDE 10 MG/ML
INJECTION, SOLUTION EPIDURAL; INFILTRATION; INTRACAUDAL; PERINEURAL
Status: COMPLETED | OUTPATIENT
Start: 2020-10-22 | End: 2020-10-22

## 2020-10-22 ASSESSMENT — PAIN SCALES - GENERAL: PAINLEVEL_OUTOF10: 6

## 2020-10-22 ASSESSMENT — PAIN DESCRIPTION - DESCRIPTORS: DESCRIPTORS: ACHING

## 2020-10-22 NOTE — H&P
HISTORY AND PHYSICAL/PRE-SEDATION ASSESSMENT    Patient:  Stephany Casillas   :  1955  Medical Record No.:  [id number]   Date:  10/22/2020  Physician:  Salena Love MD  Facility: [address] [place] [address] Drive:                 The patient is a 60 y.o. female whom presents with low back pain. Review of the imaging and physical exam of the patient confirmed the pre-procedure diagnosis. After a thorough discussion of risks, benefits and alternatives informed consent was obtained. Diagnosis:  M47.817  SPONDYLOSIS WITHOUT MYELOPATHY OR RADICULOPATHY LUMBOSACRAL    Past Medical History:   Past Medical History:   Diagnosis Date    Asthma     Atrial septal defect     Constipation     Environmental allergies     Hyperlipidemia     borderline    Pacemaker     Reflux     Thyroid disease         Past Surgical History:     Past Surgical History:   Procedure Laterality Date    CARDIAC SURGERY      atrial septal defect     SECTION      x 3    COLONOSCOPY  2017    [facility]     COLONOSCOPY  3/15/12    WNL    CYSTOURETHROSCOPY/URETHRAL DILATION      HYSTERECTOMY      complete AZEB BSO no cancer. for fibroid tumors    HYSTERECTOMY, TOTAL ABDOMINAL      with ooph for fibroids     JOINT REPLACEMENT Right     hip     PACEMAKER INSERTION      TOTAL HIP ARTHROPLASTY  2013    right side     WRIST GANGLION EXCISION      right       Current Medications:   Prior to Admission medications    Medication Sig Start Date End Date Taking?  Authorizing Provider   mirabegron (MYRBETRIQ) 50 MG TB24 Take 50 mg by mouth daily   Yes Historical Provider, MD   Ibuprofen-diphenhydrAMINE Cit (ADVIL PM PO) Take by mouth nightly as needed (sleep)   Yes Historical Provider, MD   nystatin (MYCOSTATIN) 513947 units TABS Take 1 tablet by mouth 2 times daily 20  Yes Russell Dawson MD   montelukast (SINGULAIR) 10 MG tablet TAKE ONE TABLET BY MOUTH NIGHTLY 7/29/20  Yes Patricia Zamudio MD   albuterol sulfate  (90 Base) MCG/ACT inhaler Inhale 2 puffs into the lungs every 6 hours as needed for Wheezing or Shortness of Breath 7/29/20  Yes Patricia Zamudio MD   levothyroxine (SYNTHROID) 50 MCG tablet Take 1 tablet by mouth Daily 7/29/20  Yes Patricia Zamudio MD   Ospemifene 60 MG TABS Take by mouth daily    Yes Historical Provider, MD   estradiol (ESTRING) 2 MG vaginal ring Place 2 mg vaginally once Follow package directions. Yes Historical Provider, MD   fluticasone (FLONASE) 50 MCG/ACT nasal spray 1 spray by Nasal route daily 10/10/17  Yes Meron Stephensllrenuka [employer], DO   Multiple Vitamins-Minerals (THERAPEUTIC MULTIVITAMIN-MINERALS) tablet Take 1 tablet by mouth daily. Yes Historical Provider, MD   Calcium Carbonate-Vitamin D (CALCIUM 600+D3 PO) Take 1 tablet by mouth 2 times daily    Yes Historical Provider, MD   Biotin 2500 MCG CAPS Take  by mouth 2 times daily. Yes Historical Provider, MD   Probiotic Product (PROBIOTIC DAILY PO) Take by mouth daily     Historical Provider, MD       Allergies:  Ciprofloxacin    Social History:    reports that she quit smoking about 40 years ago. Her smoking use included cigarettes. She started smoking about 48 years ago. She has a 4.00 pack-year smoking history. She has never used smokeless tobacco. She reports current alcohol use. She reports that she does not use drugs.     Family History:   Family History   Problem Relation Age of Onset    Cancer Father         lung    Heart Disease Father     High Blood Pressure Father     Diabetes Father         diet controlled diabetes     Alzheimer's Disease Mother    Mario Salm Parkinsonism Mother     Other Mother         co[d     No Known Problems Maternal Grandmother     No Known Problems Maternal Grandfather     No Known Problems Paternal Grandmother     No Known Problems Paternal Grandfather     Diabetes Paternal Aunt         Vitals: Blood pressure 137/84, pulse 62, temperature 97.7 °F (36.5 °C), temperature source Temporal, resp. rate 18, height 4' 9\" (1.448 m), weight 140 lb (63.5 kg), SpO2 100 %, not currently breastfeeding. PHYSICAL EXAM:  HENT: Airway patent and reviewed  Cardiovascular: Normal rate, regular rhythm, normal heart sounds. Pulmonary/Chest: No wheezes. No rhonchi. No rales. Abdominal: Soft. Bowel sounds are normal. No distension. Extremities: Moves all extremities equally  Lumbar Spine: Painful range of motion, no midline tenderness     ASA CLASS:         []   I. Normal, healthy adult           [x]   II.  Mild systemic disease            []   III. Severe systemic disease    Mallampati: Mallampati Class II - (soft palate, fauces & uvula are visible)      Sedation plan:   []  Local              [x]  Minimal                  []  General anesthesia    Treatment plan:  Patient's condition acceptable for planned procedure/sedation. Proceed with planned procedure   Post Procedure Plan   Return to same level of care   ______________________     The risks and benefits as well as alternatives to the procedure have been discussed with the patient and or family. The patient and/or next of kin understands and agrees to proceed.     Salena Love MD

## 2020-10-22 NOTE — PROGRESS NOTES
Teaching / education initiated regarding perioperative experience, expectations, and pain management during stay. Patient verbalized understanding.   Marina Yap RN

## 2020-10-22 NOTE — OP NOTE
PATIENT:  Stephanie Abad  AGE:  54 yrs  MEDICAL RECORD #:  [id number]  YOB: 1955     DATE:  10/22/2020  PHYSICIAN: Lenard Lim M.D. PROCEDURE: Bilateral L2, L3, L4, L5 diagnostic medial branch blocks under fluoroscopy. PRE-OP DIAGNOSIS:  Low Back Pain/ Lumbar Spondylosis     POST-OP DIAGNOSIS:  same     HISTORY OF PRESENT ILLNESS:  See office notes. Patient has failed previous less-invasive treatments. ALLERGIES:  Ciprofloxacin     MEDICATIONS:    No current facility-administered medications for this encounter. PHYSICAL EXAMINATION:              General:  Awake, alert              Heart:  No audible murmurs, extremities well perfused              Lungs:  No increased WOB or audible wheezing              Extremities:  Normal tone. No swelling. Anesthesia: None    Estimated blood loss: None    DESCRIPTION OF PROCEDURE:     Components of the procedure were again reviewed with the patient prior to the procedure. She is aware of risks including infection, bleeding, allergic reaction, and nerve injury. She had ample opportunity for additional questions. She elected to proceed with treatment. The patient was placed in the prone position. Utilizing fluoroscopy, the L2, L3, L4 and L5 areas were identified, target points for the diagnostic medial branch blocks were at the roots of the transverse processes and the superior articular processes with the exception of the L5 dorsal ramus at the sacral ala. Appropriate entry sites were selected over the skin. The skin was prepared in sterile fashion. Local anesthesia was carried out at each of the 6 entry sites with 1-2 ml lidocaine 1%. Utilizing a 22 gauge needle x 3.50 inch under fluoroscopy, each of the 8 target sites were approached. AP and oblique viewpoints were utilized to ensure appropriate needle localization. Each of the 8 diagnostic blocks were carried out utilizing 0.5 cc of Marcaine 0.5%.   The patient tolerated the procedure well. DISPOSITION:  The patient was transported to recovery. The patient was monitored for 15 to 20 minutes post-procedure. Precautions were discussed and written instructions provided. .  Patient was provided and reminded to keep a pain diary and follow-up in the clinic as previously arranged to discuss efficacy and future treatments.     Comment: None

## 2020-11-06 NOTE — PROGRESS NOTES
PATIENT REACHED   YES_X___NO____    PREOP INSTUCTIONS   There is a one visitor policy at [facility] for all surgeries and endoscopies. Whether the visitor can stay or will be asked to wait in the car will depend on the current policy and if social distancing can be maintained. The policy is subject to change at any time. Please make sure the visitor has a cell phone that is on,charged and able to accept calls, as this may be the way that the staff communicates with them. Pain management is NO VISITOR policyThe patients ride is expected to remain in the car with a cell phone for communication. If the ride is leaving the hospital grounds please make sure they are back in time for pickup. Have the patient inform the staff on arrival what their rides plans are while the patient is in the facility. At the MAIN there is one visitor allowed. Please note that the visitor policy is subject to change. DATE__11/12/20_______ TIME__1345_______ARRIVAL__1245______PLACE_masc___________  NOTHING TO EAT OR DRINK  AFTER MIDNIGHT THE EVENING PRIOR OR AS INSTRUCTED BY YOUR DR.  Freddie Hamden NEED A RESPONSIBLE ADULT AGE 18 OR OLDER TO DRIVE YOU HOME  PLEASE BRING INSURANCE CARD. PICTURE ID AND COMPLETE LIST OF MEDS  WEAR LOOSE COMFORTABLE CLOTHING  FOLLOW ANY INSTRUCTIONS YOUR DRS OFFICE HAS GIVEN YOU,INCLUDING WHAT MEDICATIONS TO TAKE THE AM OF PROCEDURE AND WHEN AND IF YOU NEED TO STOP ANY BLOOD THINNERS. IF YOU HAVE QUESTIONS REGARDING THIS CALL THE OFFICE  THE GOAL BLOOD SUGAR THE AM OF PROCEDURE  OR LESS ABOVE THAT THE PROCEDURE MAY BE CANCELLED  ANY QUESTIONS CALL YOUR DOCTOR. ALSO,PLEASE READ THE INSTRUCTION PACKET FROM YOUR DR IF YOU RECEIVED ONE.   SPINE INTERVENTION NUMBER [phone]

## 2020-11-12 ENCOUNTER — APPOINTMENT (OUTPATIENT)
Dept: GENERAL RADIOLOGY | Age: 65
End: 2020-11-12
Attending: PHYSICAL MEDICINE & REHABILITATION
Payer: COMMERCIAL

## 2020-11-12 ENCOUNTER — HOSPITAL ENCOUNTER (OUTPATIENT)
Age: 65
Setting detail: OUTPATIENT SURGERY
Discharge: HOME | End: 2020-11-12
Attending: PHYSICAL MEDICINE & REHABILITATION | Admitting: PHYSICAL MEDICINE & REHABILITATION
Payer: COMMERCIAL

## 2020-11-12 VITALS
OXYGEN SATURATION: 100 % | HEIGHT: 57 IN | RESPIRATION RATE: 16 BRPM | HEART RATE: 59 BPM | BODY MASS INDEX: 30.2 KG/M2 | DIASTOLIC BLOOD PRESSURE: 74 MMHG | TEMPERATURE: 97.6 F | WEIGHT: 140 LBS | SYSTOLIC BLOOD PRESSURE: 126 MMHG

## 2020-11-12 PROCEDURE — 77003 FLUOROGUIDE FOR SPINE INJECT: CPT

## 2020-11-12 PROCEDURE — 2500000003 HC RX 250 WO HCPCS: Performed by: PHYSICAL MEDICINE & REHABILITATION

## 2020-11-12 PROCEDURE — 2709999900 HC NON-CHARGEABLE SUPPLY: Performed by: PHYSICAL MEDICINE & REHABILITATION

## 2020-11-12 PROCEDURE — 6360000002 HC RX W HCPCS: Performed by: PHYSICAL MEDICINE & REHABILITATION

## 2020-11-12 PROCEDURE — 99152 MOD SED SAME PHYS/QHP 5/>YRS: CPT | Performed by: PHYSICAL MEDICINE & REHABILITATION

## 2020-11-12 PROCEDURE — 3610000056 HC PAIN LEVEL 4 BASE (NON-OR): Performed by: PHYSICAL MEDICINE & REHABILITATION

## 2020-11-12 RX ORDER — LIDOCAINE HYDROCHLORIDE 10 MG/ML
INJECTION, SOLUTION EPIDURAL; INFILTRATION; INTRACAUDAL; PERINEURAL
Status: COMPLETED | OUTPATIENT
Start: 2020-11-12 | End: 2020-11-12

## 2020-11-12 RX ORDER — FENTANYL CITRATE 50 UG/ML
INJECTION, SOLUTION INTRAMUSCULAR; INTRAVENOUS
Status: COMPLETED | OUTPATIENT
Start: 2020-11-12 | End: 2020-11-12

## 2020-11-12 RX ORDER — DEXAMETHASONE SODIUM PHOSPHATE 10 MG/ML
INJECTION, SOLUTION INTRAMUSCULAR; INTRAVENOUS
Status: COMPLETED | OUTPATIENT
Start: 2020-11-12 | End: 2020-11-12

## 2020-11-12 RX ORDER — MIDAZOLAM HYDROCHLORIDE 1 MG/ML
INJECTION INTRAMUSCULAR; INTRAVENOUS
Status: COMPLETED | OUTPATIENT
Start: 2020-11-12 | End: 2020-11-12

## 2020-11-12 ASSESSMENT — PAIN - FUNCTIONAL ASSESSMENT
PAIN_FUNCTIONAL_ASSESSMENT: PREVENTS OR INTERFERES SOME ACTIVE ACTIVITIES AND ADLS
PAIN_FUNCTIONAL_ASSESSMENT: 0-10

## 2020-11-12 ASSESSMENT — PAIN DESCRIPTION - PAIN TYPE
TYPE: CHRONIC PAIN

## 2020-11-12 ASSESSMENT — PAIN SCALES - GENERAL
PAINLEVEL_OUTOF10: 7

## 2020-11-12 ASSESSMENT — PAIN DESCRIPTION - DESCRIPTORS: DESCRIPTORS: ACHING

## 2020-11-12 NOTE — PROGRESS NOTES
Discharged to home with . Has instructions and ice pack. Respirations easy on room air. VSS. Denies c/o lightheadedness.

## 2020-11-12 NOTE — OP NOTE
PATIENT:  Darlene Hahn  AGE:  55 yrs  MEDICAL RECORD #:  [id number]  YOB: 1955     DATE:  11/12/2020  PHYSICIAN: Aydee Chinchilla M.D. PROCEDURE: Bilateral L5 transforaminal epidural steroid injection under fluoroscopy. PRE-OP DIAGNOSIS:  Low Back Pain/Radiculopathy     POST-OP DIAGNOSIS:  same     HISTORY OF PRESENT ILLNESS:  See office notes. Patient has failed previous less-invasive treatments. Pre-op pain score: 9    Post-op pain score: 7     ALLERGIES:  Ciprofloxacin     MEDICATIONS:    No current facility-administered medications for this encounter. PHYSICAL EXAMINATION:              General:  Awake, alert              Heart:  No audible murmurs, extremities well perfused              Lungs:  No increased WOB or audible wheezing              Extremities:  Normal tone. Warm. No swelling. Anesthesia: 1 mg Versed and 50 mcg fentanyl    Estimated blood loss: None    DESCRIPTION OF PROCEDURE:     Components of the procedure were again reviewed with the patient prior to the procedure. She is aware of risks including infection, bleeding, allergic reaction, and nerve injury. She had ample opportunity for additional questions. She elected to proceed with treatment. The patient was placed in the prone position. Cardiovascular monitoring was initiated, and vital signs were stable prior to, during, and after the procedure. Utilizing fluoroscopy, the L5 vertebrae was identified. The area was sterilely prepped and draped. Skin anesthesia was achieved at each entry site using 1-2 cc of Lidocaine 1%. A 22 g 3.50 inch spinal needle was slowly inserted into the bilateral L5 neuroforamen using AP, lateral and oblique fluoroscopic imaging. Negative aspiration was confirmed. 1 cc Isovue-M 300 was injected at each site showing contrast spread into the epidural space and along the nerve root.   A combination of 1 cc Lidocaine 1% and 10 mg dexamethasone were slowly injected at each site. The needles were removed after the stylets were repositioned. A sterile bandage was applied. The patient was brought to recovery in stable condition. The patient tolerated the procedure well. DISPOSITION:  The patient was transported to recovery. The patient was monitored for 15 to 20 minutes post-procedure. Precautions were discussed and written instructions provided. Comment: Great epidural flow bilaterally.  Significant spondylosis diffusely

## 2020-11-12 NOTE — H&P
HISTORY AND PHYSICAL/PRE-SEDATION ASSESSMENT    Patient:  Deion Goff   :  1955  Medical Record No.:  [id number]   Date:  2020  Physician:  Stefan Rogers MD  Facility: [address] [place] [address] Drive:                 The patient is a 60 y.o. female whom presents with low back pain. Review of the imaging and physical exam of the patient confirmed the pre-procedure diagnosis. After a thorough discussion of risks, benefits and alternatives informed consent was obtained. Diagnosis:  M46.96  UNSPECIFIED INFLAMMATORY SPONDYLOPATHY - LUMBAR    Past Medical History:   Past Medical History:   Diagnosis Date    Asthma     Atrial septal defect     Constipation     Environmental allergies     Hyperlipidemia     borderline    Pacemaker     Reflux     Thyroid disease         Past Surgical History:     Past Surgical History:   Procedure Laterality Date    CARDIAC SURGERY      atrial septal defect     SECTION      x 3    COLONOSCOPY  2017    normal dr Yessenia Grisgby     COLONOSCOPY  3/15/12    WNL    CYSTOURETHROSCOPY/URETHRAL DILATION      HYSTERECTOMY  1985    complete AZEB BSO no cancer. for fibroid tumors    HYSTERECTOMY, TOTAL ABDOMINAL      with ooph for fibroids     JOINT REPLACEMENT Right     hip     PACEMAKER INSERTION      PAIN MANAGEMENT PROCEDURE Bilateral 10/22/2020    BILATERAL L2, L3, L4, L5 MEDIAL BRANCH BLOCK WITH FLUOROSCOPY (19458, 32763, 57150) performed by Stefan Rogers MD at [address]  2013    right side     WRIST GANGLION EXCISION      right       Current Medications:   Prior to Admission medications    Medication Sig Start Date End Date Taking?  Authorizing Provider   mirabegron (MYRBETRIQ) 50 MG TB24 Take 50 mg by mouth daily    Historical Provider, MD   Ibuprofen-diphenhydrAMINE Cit (ADVIL PM PO) Take by mouth nightly as needed (sleep)    Historical Provider, MD   nystatin (MYCOSTATIN) 014721 units TABS Take 1 tablet by mouth 2 times daily 7/29/20   Calderon Moran MD   montelukast (SINGULAIR) 10 MG tablet TAKE ONE TABLET BY MOUTH NIGHTLY 7/29/20   Calderon Moran MD   albuterol sulfate  (90 Base) MCG/ACT inhaler Inhale 2 puffs into the lungs every 6 hours as needed for Wheezing or Shortness of Breath 7/29/20   Calderon Moran MD   levothyroxine (SYNTHROID) 50 MCG tablet Take 1 tablet by mouth Daily 7/29/20   Calderon Moran MD   Ospemifene 60 MG TABS Take by mouth daily     Historical Provider, MD   estradiol (ESTRING) 2 MG vaginal ring Place 2 mg vaginally once Follow package directions. Historical Provider, MD   fluticasone [facility]) 50 MCG/ACT nasal spray 1 spray by Nasal route daily 10/10/17   Meron Irving DO   Probiotic Product (PROBIOTIC DAILY PO) Take by mouth daily     Historical Provider, MD   Multiple Vitamins-Minerals (THERAPEUTIC MULTIVITAMIN-MINERALS) tablet Take 1 tablet by mouth daily. Historical Provider, MD   Calcium Carbonate-Vitamin D (CALCIUM 600+D3 PO) Take 1 tablet by mouth 2 times daily     Historical Provider, MD   Biotin 2500 MCG CAPS Take  by mouth 2 times daily. Historical Provider, MD       Allergies:  Ciprofloxacin    Social History:    reports that she quit smoking about 40 years ago. Her smoking use included cigarettes. She started smoking about 48 years ago. She has a 4.00 pack-year smoking history. She has never used smokeless tobacco. She reports current alcohol use. She reports that she does not use drugs.     Family History:   Family History   Problem Relation Age of Onset    Cancer Father         lung    Heart Disease Father     High Blood Pressure Father     Diabetes Father         diet controlled diabetes     Alzheimer's Disease Mother    Dossie Marjan Parkinsonism Mother     Other Mother         co[d     No Known Problems Maternal Grandmother     No Known Problems Maternal Grandfather     No Known Problems Paternal Grandmother     No Known Problems Paternal Grandfather     Diabetes Paternal Aunt         Vitals: Blood pressure (!) 158/81, pulse 67, temperature 97.6 °F (36.4 °C), temperature source Temporal, resp. rate 16, height 4' 9\" (1.448 m), weight 140 lb (63.5 kg), SpO2 100 %, not currently breastfeeding. PHYSICAL EXAM:  HENT: Airway patent and reviewed  Cardiovascular: Normal rate, regular rhythm, normal heart sounds. Pulmonary/Chest: No wheezes. No rhonchi. No rales. Abdominal: Soft. Bowel sounds are normal. No distension. Extremities: Moves all extremities equally  Lumbar Spine: Painful range of motion, no midline tenderness     ASA CLASS:         []   I. Normal, healthy adult           [x]   II.  Mild systemic disease            []   III. Severe systemic disease    Mallampati: Mallampati Class II - (soft palate, fauces & uvula are visible)      Sedation plan:   []  Local              [x]  Minimal                  []  General anesthesia    Treatment plan:  Patient's condition acceptable for planned procedure/sedation. Proceed with planned procedure   Post Procedure Plan   Return to same level of care   ______________________     The risks and benefits as well as alternatives to the procedure have been discussed with the patient and or family. The patient and/or next of kin understands and agrees to proceed.     Barby Andrew MD

## 2020-12-17 NOTE — PROGRESS NOTES
PATIENT REACHED   YES_X___NO____    PREOP INSTUCTIONS LEFT ON VM NUMBER_______________      DATE [id number]_________ [id number]_______JTMAX__RSIU__________  NOTHING TO EAT OR DRINK  AFTER MIDNIGHT THE EVENING PRIOR OR AS INSTRUCTED BY YOUR DR.  Ankit Valladares NEED A RESPONSIBLE ADULT AGE 18 OR OLDER TO DRIVE YOU HOME  PLEASE BRING INSURANCE CARD. PICTURE ID AND COMPLETE LIST OF MEDS  WEAR LOOSE COMFORTABLE CLOTHING  FOLLOW ANY INSTRUCTIONS YOUR DRS OFFICE HAS GIVEN YOU,INCLUDING WHAT MEDICATIONS TO TAKE THE AM OF PROCEDURE AND WHEN AND IF YOU NEED TO STOP ANY BLOOD THINNERS. IF YOU HAVE QUESTIONS REGARDING THIS CALL THE OFFICE  THE GOAL BLOOD SUGAR THE AM OF PROCEDURE  OR LESS ABOVE THAT THE PROCEDURE MAY BE CANCELLED  ANY QUESTIONS CALL YOUR DOCTOR. ALSO,PLEASE READ THE INSTRUCTION PACKET FROM YOUR DR IF YOU RECEIVED ONE.   SPINE INTERVENTION NUMBER [phone]

## 2020-12-22 ENCOUNTER — APPOINTMENT (OUTPATIENT)
Dept: GENERAL RADIOLOGY | Age: 65
End: 2020-12-22
Attending: PHYSICAL MEDICINE & REHABILITATION
Payer: COMMERCIAL

## 2020-12-22 ENCOUNTER — HOSPITAL ENCOUNTER (OUTPATIENT)
Age: 65
Setting detail: OUTPATIENT SURGERY
Discharge: HOME | End: 2020-12-22
Attending: PHYSICAL MEDICINE & REHABILITATION | Admitting: PHYSICAL MEDICINE & REHABILITATION
Payer: COMMERCIAL

## 2020-12-22 VITALS
OXYGEN SATURATION: 100 % | WEIGHT: 140 LBS | SYSTOLIC BLOOD PRESSURE: 126 MMHG | TEMPERATURE: 97.2 F | RESPIRATION RATE: 16 BRPM | HEIGHT: 57 IN | BODY MASS INDEX: 30.2 KG/M2 | HEART RATE: 66 BPM | DIASTOLIC BLOOD PRESSURE: 82 MMHG

## 2020-12-22 PROCEDURE — 2709999900 HC NON-CHARGEABLE SUPPLY: Performed by: PHYSICAL MEDICINE & REHABILITATION

## 2020-12-22 PROCEDURE — 3610000054 HC PAIN LEVEL 3 BASE (NON-OR): Performed by: PHYSICAL MEDICINE & REHABILITATION

## 2020-12-22 PROCEDURE — 77003 FLUOROGUIDE FOR SPINE INJECT: CPT

## 2020-12-22 PROCEDURE — 6360000002 HC RX W HCPCS: Performed by: PHYSICAL MEDICINE & REHABILITATION

## 2020-12-22 PROCEDURE — 2580000003 HC RX 258: Performed by: PHYSICAL MEDICINE & REHABILITATION

## 2020-12-22 PROCEDURE — 2500000003 HC RX 250 WO HCPCS: Performed by: PHYSICAL MEDICINE & REHABILITATION

## 2020-12-22 PROCEDURE — 99152 MOD SED SAME PHYS/QHP 5/>YRS: CPT | Performed by: PHYSICAL MEDICINE & REHABILITATION

## 2020-12-22 RX ORDER — FENTANYL CITRATE 50 UG/ML
INJECTION, SOLUTION INTRAMUSCULAR; INTRAVENOUS
Status: COMPLETED | OUTPATIENT
Start: 2020-12-22 | End: 2020-12-22

## 2020-12-22 RX ORDER — DEXAMETHASONE SODIUM PHOSPHATE 10 MG/ML
INJECTION, SOLUTION INTRAMUSCULAR; INTRAVENOUS
Status: COMPLETED | OUTPATIENT
Start: 2020-12-22 | End: 2020-12-22

## 2020-12-22 RX ORDER — MIDAZOLAM HYDROCHLORIDE 1 MG/ML
INJECTION INTRAMUSCULAR; INTRAVENOUS
Status: COMPLETED | OUTPATIENT
Start: 2020-12-22 | End: 2020-12-22

## 2020-12-22 RX ORDER — LIDOCAINE HYDROCHLORIDE 10 MG/ML
INJECTION, SOLUTION EPIDURAL; INFILTRATION; INTRACAUDAL; PERINEURAL
Status: COMPLETED | OUTPATIENT
Start: 2020-12-22 | End: 2020-12-22

## 2020-12-22 RX ORDER — SODIUM CHLORIDE 9 MG/ML
INJECTION INTRAVENOUS
Status: COMPLETED | OUTPATIENT
Start: 2020-12-22 | End: 2020-12-22

## 2020-12-22 ASSESSMENT — PAIN SCALES - GENERAL
PAINLEVEL_OUTOF10: 0

## 2020-12-22 ASSESSMENT — PAIN DESCRIPTION - DESCRIPTORS: DESCRIPTORS: CONSTANT;ACHING

## 2020-12-22 NOTE — PROGRESS NOTES
Pt ready for discharge - pt discharged in stable condition with appropriate BP per Dr. Casi Philippe.

## 2020-12-22 NOTE — PROGRESS NOTES
Pt's BP increased  - 300 mL N/S infused  480 mL consumed - pt stated that she feels good and would like to dress and go home.

## 2020-12-22 NOTE — H&P
HISTORY AND PHYSICAL/PRE-SEDATION ASSESSMENT    Patient:  Rosio Smart   :  1955  Medical Record No.:  [id number]   Date:  2020  Physician:  Anat Plasencia MD  Facility: [address] [place] [address] Drive:                 The patient is a 60 y.o. female whom presents with neck pain. Review of the imaging and physical exam of the patient confirmed the pre-procedure diagnosis. After a thorough discussion of risks, benefits and alternatives informed consent was obtained. Diagnosis:  M54.12    Past Medical History:   Past Medical History:   Diagnosis Date    Asthma     [facility] fl[facility]     Atrial septal defect     Constipation     Environmental allergies     H/O degenerative disc disease     Hyperlipidemia     borderline    Pacemaker     Reflux     [facility]     Thyroid disease         Past Surgical History:     Past Surgical History:   Procedure Laterality Date    CARDIAC SURGERY      atrial septal defect     SECTION      x 3    COLONOSCOPY  2017    normal dr [place]     COLONOSCOPY  3/15/12    WNL    CYSTOURETHROSCOPY/URETHRAL DILATION      HYSTERECTOMY  1985    complete AZEB BSO no cancer. for fibroid tumors    HYSTERECTOMY, TOTAL ABDOMINAL      with ooph for fibroids     JOINT REPLACEMENT Right     hip     PACEMAKER INSERTION      PAIN MANAGEMENT PROCEDURE Bilateral 10/22/2020    BILATERAL L2, L3, L4, L5 MEDIAL BRANCH BLOCK WITH FLUOROSCOPY (34118, 66573, 77695) performed by Anat Plasencia MD at [address] Bilateral 2020    BILATERAL L5 TRANSFORAMINAL EPIDURAL STEROID INJECTION WITH FLUOROSCOPY performed by Anat Plasencia MD at [address]  2013    right side     WRIST GANGLION EXCISION      right       Current Medications:   Prior to Admission medications    Medication Sig Start Date End Date Taking?  Authorizing Provider mirabegron (MYRBETRIQ) 50 MG TB24 Take 50 mg by mouth daily   Yes Historical Provider, MD   nystatin (MYCOSTATIN) 817369 units TABS Take 1 tablet by mouth 2 times daily 7/29/20  Yes Ade Borjas MD   montelukast (SINGULAIR) 10 MG tablet TAKE ONE TABLET BY MOUTH NIGHTLY 7/29/20  Yes Ade Borjas MD   albuterol sulfate  (90 Base) MCG/ACT inhaler Inhale 2 puffs into the lungs every 6 hours as needed for Wheezing or Shortness of Breath 7/29/20  Yes Ade Borjas MD   levothyroxine (SYNTHROID) 50 MCG tablet Take 1 tablet by mouth Daily 7/29/20  Yes Ade Borjas MD   Ospemifene 60 MG TABS Take by mouth daily    Yes Historical Provider, MD   fluticasone (FLONASE) 50 MCG/ACT nasal spray 1 spray by Nasal route daily 10/10/17  Yes Meron Irving, DO   Multiple Vitamins-Minerals (THERAPEUTIC MULTIVITAMIN-MINERALS) tablet Take 1 tablet by mouth daily. Yes Historical Provider, MD   Calcium Carbonate-Vitamin D (CALCIUM 600+D3 PO) Take 1 tablet by mouth 2 times daily    Yes Historical Provider, MD   Biotin 2500 MCG CAPS Take  by mouth 2 times daily. Yes Historical Provider, MD   Ibuprofen-diphenhydrAMINE Cit (ADVIL PM PO) Take by mouth nightly as needed (sleep)    Historical Provider, MD   estradiol (ESTRING) 2 MG vaginal ring Place 2 mg vaginally once Follow package directions. Historical Provider, MD   Probiotic Product (PROBIOTIC DAILY PO) Take by mouth daily     Historical Provider, MD       Allergies:  Ciprofloxacin    Social History:    reports that she quit smoking about 41 years ago. Her smoking use included cigarettes. She started smoking about 49 years ago. She has a 4.00 pack-year smoking history. She has never used smokeless tobacco. She reports current alcohol use. She reports that she does not use drugs.     Family History:   Family History   Problem Relation Age of Onset    Cancer Father         lung    Heart Disease Father     High Blood Pressure Father     Diabetes Father diet controlled diabetes     Alzheimer's Disease Mother    [facility] Parkinsonism Mother     Other Mother         co[d     No Known Problems Maternal Grandmother     No Known Problems Maternal Grandfather     No Known Problems Paternal Grandmother     No Known Problems Paternal Grandfather     Diabetes Paternal Aunt         Vitals: Blood pressure (!) 136/90, pulse 75, temperature 97.2 °F (36.2 °C), temperature source Temporal, resp. rate 20, height 4' 9\" (1.448 m), weight 140 lb (63.5 kg), SpO2 99 %, not currently breastfeeding. PHYSICAL EXAM:  HENT: Airway patent and reviewed  Cardiovascular: Normal rate, regular rhythm, normal heart sounds. Pulmonary/Chest: No wheezes. No rhonchi. No rales. Abdominal: Soft. Bowel sounds are normal. No distension. Extremities: Moves all extremities equally  Lumbar Spine: Painful range of motion, no midline tenderness     ASA CLASS:         []   I. Normal, healthy adult           [x]   II.  Mild systemic disease            []   III. Severe systemic disease    Mallampati: Mallampati Class II - (soft palate, fauces & uvula are visible)      Sedation plan:   []  Local              [x]  Minimal                  []  General anesthesia    Treatment plan:  Patient's condition acceptable for planned procedure/sedation. Proceed with planned procedure   Post Procedure Plan   Return to same level of care   ______________________     The risks and benefits as well as alternatives to the procedure have been discussed with the patient and or family. The patient and/or next of kin understands and agrees to proceed.     Stefan Rogers MD

## 2020-12-22 NOTE — OP NOTE
PATIENT:  Herminia Amaro  AGE:  43 yrs  MEDICAL RECORD #:  [id number]  YOB: 1955     DATE:  12/22/2020  PHYSICIAN: Sujatha Salinas M.D. PROCEDURE: C7-T1 interlaminar epidural steroid injection under fluoroscopy. PRE-OP DIAGNOSIS:  Neck Pain/Radiculopathy     POST-OP DIAGNOSIS:  same     HISTORY OF PRESENT ILLNESS:  See office notes. Patient has failed previous less-invasive treatments. Pre-op pain score: 8    Post-op pain score: 0     ALLERGIES:  Ciprofloxacin     MEDICATIONS:    No current facility-administered medications for this encounter. PHYSICAL EXAMINATION:              General:  Awake, alert              Heart:  No audible murmurs, extremities well perfused              Lungs:  No increased WOB or audible wheezing              Extremities:  Normal tone. Warm. No swelling. Anesthesia: 1 mg Versed and 50 mcg fentanyl    Estimated blood loss: None    DESCRIPTION OF PROCEDURE:     Components of the procedure were again reviewed with the patient prior to the procedure. She is aware of risks including infection, bleeding, allergic reaction, and nerve injury. She had ample opportunity for additional questions. She elected to proceed with treatment. The patient was placed in the prone position. Cardiovascular monitoring was initiated, and vital signs were stable prior to, during, and after the procedure. Utilizing fluoroscopy, the C7-T1 vertebrae were identified. The area was sterilely prepped and draped. Skin anesthesia was achieved at the entry site using 1-2 cc of Lidocaine 1%. A 22 g 3.50 inch Touhy spinal needle was slowly inserted into the C7-T1 interlaminar epidural space using AP, lateral and oblique fluoroscopic imaging and loss of resistance technique. Negative aspiration was confirmed. 1 cc Isovue-M 300 was injected showing contrast spread into the epidural space. A combination of 2 cc normal saline and 10 mg dexamethasone were slowly injected.  The needle was removed after the stylet was repositioned. A sterile bandage was applied. The patient was brought to recovery in stable condition. The patient tolerated the procedure well. DISPOSITION:  The patient was transported to recovery. The patient was monitored for 15 to 20 minutes post-procedure. Precautions were discussed and written instructions provided.     Comment: None

## 2020-12-22 NOTE — PROGRESS NOTES
Teaching / education initiated regarding perioperative experience, expectations, and pain management during stay. Patient verbalized understanding.   Ana Emerson RN

## 2020-12-30 ENCOUNTER — OFFICE VISIT (OUTPATIENT)
Dept: ORTHOPEDIC SURGERY | Age: 65
End: 2020-12-30
Payer: COMMERCIAL

## 2020-12-30 VITALS — WEIGHT: 140 LBS | TEMPERATURE: 97.3 F | BODY MASS INDEX: 30.2 KG/M2 | HEIGHT: 57 IN

## 2020-12-30 DIAGNOSIS — M16.12 PRIMARY OSTEOARTHRITIS OF LEFT HIP: Primary | ICD-10-CM

## 2020-12-30 PROCEDURE — 99214 OFFICE O/P EST MOD 30 MIN: CPT | Performed by: ORTHOPAEDIC SURGERY

## 2020-12-30 PROCEDURE — 1036F TOBACCO NON-USER: CPT | Performed by: ORTHOPAEDIC SURGERY

## 2020-12-30 PROCEDURE — G8427 DOCREV CUR MEDS BY ELIG CLIN: HCPCS | Performed by: ORTHOPAEDIC SURGERY

## 2020-12-30 PROCEDURE — 3017F COLORECTAL CA SCREEN DOC REV: CPT | Performed by: ORTHOPAEDIC SURGERY

## 2020-12-30 PROCEDURE — G8417 CALC BMI ABV UP PARAM F/U: HCPCS | Performed by: ORTHOPAEDIC SURGERY

## 2020-12-30 PROCEDURE — G8484 FLU IMMUNIZE NO ADMIN: HCPCS | Performed by: ORTHOPAEDIC SURGERY

## 2020-12-30 RX ORDER — BETAMETHASONE SODIUM PHOSPHATE AND BETAMETHASONE ACETATE 3; 3 MG/ML; MG/ML
12 INJECTION, SUSPENSION INTRA-ARTICULAR; INTRALESIONAL; INTRAMUSCULAR; SOFT TISSUE ONCE
Status: DISCONTINUED | OUTPATIENT
Start: 2020-12-30 | End: 2022-02-22

## 2020-12-30 RX ORDER — LIDOCAINE HYDROCHLORIDE 10 MG/ML
5 INJECTION, SOLUTION INFILTRATION; PERINEURAL ONCE
Status: DISCONTINUED | OUTPATIENT
Start: 2020-12-30 | End: 2021-11-08

## 2020-12-30 RX ORDER — BETAMETHASONE SODIUM PHOSPHATE AND BETAMETHASONE ACETATE 3; 3 MG/ML; MG/ML
6 INJECTION, SUSPENSION INTRA-ARTICULAR; INTRALESIONAL; INTRAMUSCULAR; SOFT TISSUE ONCE
Status: COMPLETED | OUTPATIENT
Start: 2020-12-30 | End: 2020-12-30

## 2020-12-30 RX ORDER — LIDOCAINE HYDROCHLORIDE 10 MG/ML
1 INJECTION, SOLUTION INFILTRATION; PERINEURAL ONCE
Status: COMPLETED | OUTPATIENT
Start: 2020-12-30 | End: 2020-12-30

## 2020-12-30 RX ADMIN — LIDOCAINE HYDROCHLORIDE 1 ML: 10 INJECTION, SOLUTION INFILTRATION; PERINEURAL at 14:08

## 2020-12-30 RX ADMIN — BETAMETHASONE SODIUM PHOSPHATE AND BETAMETHASONE ACETATE 6 MG: 3; 3 INJECTION, SUSPENSION INTRA-ARTICULAR; INTRALESIONAL; INTRAMUSCULAR; SOFT TISSUE at 14:07

## 2020-12-30 NOTE — PROGRESS NOTES
Rebbecca Paget Abbott, MD  [address], [address] [place] [address] Drive  [address] [facility] [address] [address]  [address]     History of Present Illness:  Chief Complaint   Patient presents with    New Patient     left hip pain; Amena Hernandez is a 60 y.o. female here for evaluation of left hip pain. Pain assessment has been completed and is documented below. Patient was referred here for orthopaedic evaluation by Dr. Sabina Paz. Her left hip pain is focused medially in the groin. She describes her pain as a popping sensation. She denies pain at night. She experiences an increase in her pain when standing for long periods of time, like when doing dishes, as well as walking. Overall, she feels that the hip pain has been inhibiting her activities. She has previously proceeded with a right hip replacement in 2011. She is currently seeing Dr. Sabina Paz for her back pain and has received epidurals from him. She has a pacemaker and has started taking Xarelto recently. 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 She has to follow up with her cardiologist to possibly shock her heart soon. She is curious about the inflammation in her hip and arthritis.         Pain Assessment  Location of Pain: Pelvis(left)  Location Modifiers: Left  Severity of Pain: 7  Quality of Pain: Aching, Popping  Duration of Pain: Persistent  Frequency of Pain: Intermittent  Aggravating Factors: Standing, Walking  Limiting Behavior: Some  Relieving Factors: Rest  Result of Injury: No  Work-Related Injury: No  Are there other pain locations you wish to document?: No    Medication Review:  Current Outpatient Medications   Medication Sig Dispense Refill    mirabegron (MYRBETRIQ) 50 MG TB24 Take 50 mg by mouth daily      Ibuprofen-diphenhydrAMINE Cit (ADVIL PM PO) Take by mouth nightly as needed (sleep)  nystatin (MYCOSTATIN) 336918 units TABS Take 1 tablet by mouth 2 times daily 60 tablet 1    montelukast (SINGULAIR) 10 MG tablet TAKE ONE TABLET BY MOUTH NIGHTLY 90 tablet 3    albuterol sulfate  (90 Base) MCG/ACT inhaler Inhale 2 puffs into the lungs every 6 hours as needed for Wheezing or Shortness of Breath 1 Inhaler 3    levothyroxine (SYNTHROID) 50 MCG tablet Take 1 tablet by mouth Daily 90 tablet 1    Ospemifene 60 MG TABS Take by mouth daily       estradiol (ESTRING) 2 MG vaginal ring Place 2 mg vaginally once Follow package directions.  fluticasone (FLONASE) 50 MCG/ACT nasal spray 1 spray by Nasal route daily 1 Bottle 11    Multiple Vitamins-Minerals (THERAPEUTIC MULTIVITAMIN-MINERALS) tablet Take 1 tablet by mouth daily.  Calcium Carbonate-Vitamin D (CALCIUM 600+D3 PO) Take 1 tablet by mouth 2 times daily       Biotin 2500 MCG CAPS Take  by mouth 2 times daily. Current Facility-Administered Medications   Medication Dose Route Frequency Provider Last Rate Last Admin    betamethasone acetate-betamethasone sodium phosphate (CELESTONE) injection 12 mg  12 mg Intra-articular Once Cookie Carrero MD        lidocaine 1 % injection 5 mL  5 mL Intra-articular Once Cookie Carrero MD             Review of Systems:  Relevant review of systems reviewed and can be found in the Media section of patient's chart.        Medical History:  Past Medical History:   Diagnosis Date    Asthma     Atrial flutter ([address]     Atrial septal defect     Constipation     Environmental allergies     H/O degenerative disc disease     Hyperlipidemia     borderline    Pacemaker     Reflux     SSS (sick sinus syndrome) ([address]     Thyroid disease         Past Surgical History:   Procedure Laterality Date    CARDIAC PACEMAKER PLACEMENT  2019    CARDIAC SURGERY      atrial septal defect     SECTION      x 3    COLONOSCOPY  2017    normal dr Minal Rich  COLONOSCOPY  3/15/12    WNL    CYSTOURETHROSCOPY/URETHRAL DILATION      HYSTERECTOMY  1985    complete AZEB BSO no cancer. for fibroid tumors    HYSTERECTOMY, TOTAL ABDOMINAL      with ooph for fibroids     JOINT REPLACEMENT Right     hip     PACEMAKER INSERTION      PAIN MANAGEMENT PROCEDURE Bilateral 10/22/2020    BILATERAL L2, L3, L4, L5 MEDIAL BRANCH BLOCK WITH FLUOROSCOPY (02185, 56868, 10479) performed by Bailey Hernández MD at [address] Bilateral 11/12/2020    BILATERAL L5 TRANSFORAMINAL EPIDURAL STEROID INJECTION WITH FLUOROSCOPY performed by Bailey Hernández MD at [address] [place] [address] N/A 12/22/2020    C7-T1 INTERLAMINAR EPIDURAL STEROID INJECTION WITH FLUOROSCOPY performed by Bailey Hernández MD at [address]  12/2/2013    right side     WRIST GANGLION EXCISION      right      Allergies, social and family histories, and medications were reviewed and updated as appropriate. General Exam:  Vital Signs:  Temp 97.3 °F (36.3 °C)   Ht 4' 9\" (1.448 m)   Wt 140 lb (63.5 kg)   LMP  (LMP Unknown)   BMI 30.30 kg/m²    Constitutional: Patient is adequately groomed with no evidence of malnutrition. Mental Status: The patient is oriented to time, place and person. The patient's mood and affect are appropriate. Lymphatic: The lymphatic examination bilaterally reveals all areas to be without enlargement or induration. Vascular: Examination reveals no swelling or calf tenderness. 2+ palpable pulses distally. Neurological: The patient has good coordination. There is no focal weakness or sensory deficit. Focal examination of left hip is as follows: Inspection:  Normal muscle contours and no significant limb length discrepancy. No gross atrophy in any particular myotome. Palpation: Mild tenderness to the greater trochanter/trochanteric bursa. No tenderness to the sacroiliac joint. No tenderness to the knee joint. Range of Motion:   ? Hip flexion 90°. Hip abduction 45°.    ? Hip internal rotation at 90° of flexion is 10°. Hip external rotation at 90° of flexion is 40°.    ? Knee range of motion shows functional range without pain. Ankle dorsiflexion and plantarflexion show functional range of motion. Strength:   ? Hip flexion 5-/5   ? Hip abduction 5-/ 5   ? Hip adduction 5-/5.   ? Knee flexion and extension 4±5/5 without pain. Dorsiflexion and plantarflexion in ankle are 4±5/5. Special Tests:   ? Log roll positive. ? Stinchfield's positive. ? Straight leg raise negative for radicular signs. ? FADIR positive. AUSTYN negative. Additional comments: Sensation to light touch grossly present and capillary refill less than 2 seconds. Skin: There are no rashes, ulcerations or lesions. Gait: Slight decreased stride length and weight shift, favoring her left side. Radiology:     X-rays were obtained on 7/31/2020 and reviewed today in office:     FINDINGS:   AP view of the pelvis demonstrates pubic rami intact.  Iliac bones intact. Right hip prosthesis is noted.  Two views of the left hip demonstrate   moderate joint space narrowing with severe periarticular sclerotic and cystic   degenerative changes.  Suspect chondrocalcinosis as well.  Degenerative   changes about the greater trochanter. Office Orders/Procedures:  Orders Placed This Encounter   Procedures    US GUIDED NEEDLE PLACEMENT     Standing Status:   Future     Number of Occurrences:   1     Standing Expiration Date:   12/30/2021       Impression:   Diagnosis Orders   1.  Primary osteoarthritis of left hip  US GUIDED NEEDLE PLACEMENT    betamethasone acetate-betamethasone sodium phosphate (CELESTONE) injection 6 mg    lidocaine 1 % injection 1 mL        Treatment Plan: I have discussed treatment options with the patient. After reviewing her x-rays, her right total hip arthroplasty is stable. Her left hip has severe arthritis. However it is not currently bone on bone but over time it will continue to progress. Considering the issues with her heart she is currently having, we will proceed with a left hip injection under ultrasound today to see if it will offer her a few months of relief by reducing her inflammation. Informed her that the injection medication is similar to the epidurals. Once she has her heart issues under control, she could be a candidate for a hip replacement. 1. Injection with cortisone was recommended into left hip joint using ultrasound visualization. Patient understands the risks and benefits of the injection and describes no potential allergies. Time out was performed to verify correct person, correct procedure, and correct site. 2. Ultrasound visualization of the femoral neck-head junction was first performed utilizing an 5/2 MHz Curved Probe. Next, the femoral artery and vein were visualized medial to the femoral head using ultrasound. The location of the needle insertion was determined to be well lateral of the neurovascular structures. The site was then prepped with betadine. A sterile cover was placed over the transducer head and the femoral head-neck junction was once again visualized. A 20-gauge spinal needle was then introduced under ultrasound control to the head-neck junction. Once the needle was intracapsular, the hip joint was injected with 1 mL of 1% Lidocaine mixed with 1 ml of Betamethasone. Patient tolerated the procedure well and did report modest relief of hip pain within 5 minutes of the injection. Patient remained hemodynamically stable throughout and following the injection. I have reviewed patient's pertinent medical history, relevant laboratory and imaging studies, and past surgical history. Patient's medications have been reviewed and were discussed during the visit. Patient was advised to keep future appointments with their respective specialty care team(s). Patient had the opportunity to ask questions, all of which were answered to the best of my ability and with patient satisfaction. Patient understands and is agreeable with the care plan following today's visit. Patient is to schedule an appointment for any new or worsening symptoms. By signing my name below, yesenia Moreland that this documentation has been prepared under the direction and in the presence of Tatyana Rowe MD.   Electronically Signed: Lu Linares, 12/30/20, 9:29 AM EST. Noe Meyer MD, personally performed the services described in this documentation. All medical record entries made by the lu were at my direction and in my presence. I have reviewed the chart and discharge instructions (if applicable) and agree that the record reflects my personal performance and is accurate and complete. Tatyana Rowe MD, 1/17/21, 4:38 PM EST. Some documentation was done using voice recognition dragon software. Every effort was made to ensure accuracy; however, inadvertent unintentional computerized transcription errors may be present.

## 2021-02-19 DIAGNOSIS — E03.9 ACQUIRED HYPOTHYROIDISM: ICD-10-CM

## 2021-02-21 RX ORDER — LEVOTHYROXINE SODIUM 0.05 MG/1
TABLET ORAL
Qty: 30 TABLET | Refills: 5 | Status: SHIPPED | OUTPATIENT
Start: 2021-02-21 | End: 2021-08-19

## 2021-07-24 DIAGNOSIS — J45.20 MILD INTERMITTENT ASTHMA WITHOUT COMPLICATION: ICD-10-CM

## 2021-07-26 DIAGNOSIS — Z00.00 ANNUAL PHYSICAL EXAM: ICD-10-CM

## 2021-07-26 DIAGNOSIS — E03.9 ACQUIRED HYPOTHYROIDISM: Primary | ICD-10-CM

## 2021-07-26 RX ORDER — MONTELUKAST SODIUM 10 MG/1
TABLET ORAL
Qty: 90 TABLET | Refills: 2 | Status: SHIPPED | OUTPATIENT
Start: 2021-07-26 | End: 2022-04-27

## 2021-07-26 NOTE — TELEPHONE ENCOUNTER
Talked to the patient and patient said she will call in  and make the appointment . Patient wants the lab order put in. Patient have few more medication left and wants to called in .

## 2021-08-02 DIAGNOSIS — Z00.00 ANNUAL PHYSICAL EXAM: ICD-10-CM

## 2021-08-02 DIAGNOSIS — E03.9 ACQUIRED HYPOTHYROIDISM: ICD-10-CM

## 2021-08-02 LAB
A/G RATIO: 2.1 (ref 1.1–2.2)
ALBUMIN SERPL-MCNC: 4.4 G/DL (ref 3.4–5)
ALP BLD-CCNC: 72 U/L (ref 40–129)
ALT SERPL-CCNC: 15 U/L (ref 10–40)
ANION GAP SERPL CALCULATED.3IONS-SCNC: 15 MMOL/L (ref 3–16)
AST SERPL-CCNC: 23 U/L (ref 15–37)
BILIRUB SERPL-MCNC: 0.3 MG/DL (ref 0–1)
BUN BLDV-MCNC: 15 MG/DL (ref 7–20)
CALCIUM SERPL-MCNC: 10 MG/DL (ref 8.3–10.6)
CHLORIDE BLD-SCNC: 103 MMOL/L (ref 99–110)
CO2: 23 MMOL/L (ref 21–32)
CREAT SERPL-MCNC: 0.7 MG/DL (ref 0.6–1.2)
GFR AFRICAN AMERICAN: >60
GFR NON-AFRICAN AMERICAN: >60
GLOBULIN: 2.1 G/DL
GLUCOSE BLD-MCNC: 82 MG/DL (ref 70–99)
HCT VFR BLD CALC: 39.3 % (ref 36–48)
HEMOGLOBIN: 13.2 G/DL (ref 12–16)
MCH RBC QN AUTO: 30.9 PG (ref 26–34)
MCHC RBC AUTO-ENTMCNC: 33.5 G/DL (ref 31–36)
MCV RBC AUTO: 92 FL (ref 80–100)
PDW BLD-RTO: 14.4 % (ref 12.4–15.4)
PLATELET # BLD: 187 K/UL (ref 135–450)
PMV BLD AUTO: 9.4 FL (ref 5–10.5)
POTASSIUM SERPL-SCNC: 4.6 MMOL/L (ref 3.5–5.1)
RBC # BLD: 4.27 M/UL (ref 4–5.2)
SODIUM BLD-SCNC: 141 MMOL/L (ref 136–145)
TOTAL PROTEIN: 6.5 G/DL (ref 6.4–8.2)
TSH SERPL DL<=0.05 MIU/L-ACNC: 2.22 UIU/ML (ref 0.27–4.2)
WBC # BLD: 4.5 K/UL (ref 4–11)

## 2021-08-19 DIAGNOSIS — E03.9 ACQUIRED HYPOTHYROIDISM: ICD-10-CM

## 2021-08-19 RX ORDER — LEVOTHYROXINE SODIUM 0.05 MG/1
TABLET ORAL
Qty: 90 TABLET | Refills: 0 | Status: SHIPPED | OUTPATIENT
Start: 2021-08-19 | End: 2021-12-01 | Stop reason: SDUPTHER

## 2021-08-24 ENCOUNTER — TELEPHONE (OUTPATIENT)
Dept: FAMILY MEDICINE CLINIC | Age: 66
End: 2021-08-24

## 2021-08-24 NOTE — TELEPHONE ENCOUNTER
Patient is scheduled for physical this Thursday, butt yesterday she bumped her big toe and toe nail is just hanging on and very painful. She is asking if you can remove the toe nail today or does she she to see someone else.     Please call, [phone]

## 2021-08-26 ENCOUNTER — OFFICE VISIT (OUTPATIENT)
Dept: FAMILY MEDICINE CLINIC | Age: 66
End: 2021-08-26
Payer: COMMERCIAL

## 2021-08-26 VITALS
HEART RATE: 74 BPM | OXYGEN SATURATION: 98 % | HEIGHT: 57 IN | WEIGHT: 140 LBS | DIASTOLIC BLOOD PRESSURE: 64 MMHG | BODY MASS INDEX: 30.2 KG/M2 | SYSTOLIC BLOOD PRESSURE: 128 MMHG | TEMPERATURE: 97.6 F

## 2021-08-26 DIAGNOSIS — J45.20 MILD INTERMITTENT ASTHMA WITHOUT COMPLICATION: ICD-10-CM

## 2021-08-26 DIAGNOSIS — E03.9 ACQUIRED HYPOTHYROIDISM: ICD-10-CM

## 2021-08-26 DIAGNOSIS — Z12.31 ENCOUNTER FOR SCREENING MAMMOGRAM FOR MALIGNANT NEOPLASM OF BREAST: ICD-10-CM

## 2021-08-26 DIAGNOSIS — Q21.10 ASD (ATRIAL SEPTAL DEFECT): ICD-10-CM

## 2021-08-26 DIAGNOSIS — I49.5 SSS (SICK SINUS SYNDROME) (HCC): ICD-10-CM

## 2021-08-26 DIAGNOSIS — Z00.00 ANNUAL PHYSICAL EXAM: Primary | ICD-10-CM

## 2021-08-26 PROCEDURE — 99396 PREV VISIT EST AGE 40-64: CPT | Performed by: INTERNAL MEDICINE

## 2021-08-26 RX ORDER — LORATADINE 10 MG/1
10 TABLET ORAL DAILY
Qty: 30 TABLET | Refills: 1 | COMMUNITY
Start: 2021-08-26 | End: 2021-11-08

## 2021-08-26 RX ORDER — SOTALOL HYDROCHLORIDE 80 MG/1
8 TABLET ORAL 2 TIMES DAILY
COMMUNITY
End: 2021-11-08 | Stop reason: ALTCHOICE

## 2021-08-26 SDOH — ECONOMIC STABILITY: TRANSPORTATION INSECURITY
IN THE PAST 12 MONTHS, HAS THE LACK OF TRANSPORTATION KEPT YOU FROM MEDICAL APPOINTMENTS OR FROM GETTING MEDICATIONS?: NO

## 2021-08-26 SDOH — ECONOMIC STABILITY: FOOD INSECURITY: WITHIN THE PAST 12 MONTHS, THE FOOD YOU BOUGHT JUST DIDN'T LAST AND YOU DIDN'T HAVE MONEY TO GET MORE.: NEVER TRUE

## 2021-08-26 SDOH — ECONOMIC STABILITY: TRANSPORTATION INSECURITY
IN THE PAST 12 MONTHS, HAS LACK OF TRANSPORTATION KEPT YOU FROM MEETINGS, WORK, OR FROM GETTING THINGS NEEDED FOR DAILY LIVING?: NO

## 2021-08-26 SDOH — ECONOMIC STABILITY: FOOD INSECURITY: WITHIN THE PAST 12 MONTHS, YOU WORRIED THAT YOUR FOOD WOULD RUN OUT BEFORE YOU GOT MONEY TO BUY MORE.: NEVER TRUE

## 2021-08-26 ASSESSMENT — ENCOUNTER SYMPTOMS
TROUBLE SWALLOWING: 0
BACK PAIN: 1
DIARRHEA: 0
COUGH: 1
SINUS PRESSURE: 1
CONSTIPATION: 1
RHINORRHEA: 1
BLOOD IN STOOL: 0
NAUSEA: 0
SHORTNESS OF BREATH: 0
VOMITING: 0
WHEEZING: 0
SORE THROAT: 0
ABDOMINAL PAIN: 0

## 2021-08-26 ASSESSMENT — PATIENT HEALTH QUESTIONNAIRE - PHQ9
1. LITTLE INTEREST OR PLEASURE IN DOING THINGS: 0
SUM OF ALL RESPONSES TO PHQ QUESTIONS 1-9: 0
2. FEELING DOWN, DEPRESSED OR HOPELESS: 0
SUM OF ALL RESPONSES TO PHQ QUESTIONS 1-9: 0
SUM OF ALL RESPONSES TO PHQ QUESTIONS 1-9: 0
SUM OF ALL RESPONSES TO PHQ9 QUESTIONS 1 & 2: 0

## 2021-08-26 ASSESSMENT — SOCIAL DETERMINANTS OF HEALTH (SDOH): HOW HARD IS IT FOR YOU TO PAY FOR THE VERY BASICS LIKE FOOD, HOUSING, MEDICAL CARE, AND HEATING?: NOT HARD AT ALL

## 2021-08-26 NOTE — PROGRESS NOTES
 estradiol (ESTRING) 2 MG vaginal ring Place 2 mg vaginally once Follow package directions.  fluticasone (FLONASE) 50 MCG/ACT nasal spray 1 spray by Nasal route daily 1 Bottle 11    Multiple Vitamins-Minerals (THERAPEUTIC MULTIVITAMIN-MINERALS) tablet Take 1 tablet by mouth daily.  Calcium Carbonate-Vitamin D (CALCIUM 600+D3 PO) Take 1 tablet by mouth 2 times daily       Biotin 2500 MCG CAPS Take  by mouth 2 times daily. Current Facility-Administered Medications   Medication Dose Route Frequency Provider Last Rate Last Admin    betamethasone acetate-betamethasone sodium phosphate (CELESTONE) injection 12 mg  12 mg Intra-articular Once Aura Helms MD        lidocaine 1 % injection 5 mL  5 mL Intra-articular Once Aura Helms MD             Social History     Socioeconomic History    Marital status:      Spouse name: Not on file    Number of children: Not on file    Years of education: Not on file    Highest education level: Not on file   Occupational History    Not on file   Tobacco Use    Smoking status: Former Smoker     Packs/day: 0.50     Years: 8.00     Pack years: 4.00     Types: Cigarettes     Start date: 1972     Quit date: 1980     Years since quittin.6    Smokeless tobacco: Never Used   Vaping Use    Vaping Use: Never used   Substance and Sexual Activity    Alcohol use: Yes     Comment: rare, occ glass wine    Drug use: Never    Sexual activity: Not Currently     Partners: Male   Other Topics Concern    Not on file   Social History Narrative    Not on file     Social Determinants of Health     Financial Resource Strain: Low Risk     Difficulty of Paying Living Expenses: Not hard at all   Food Insecurity: No Food Insecurity    Worried About [facility] [facility] in the Last Year: Never true    [address] [employer] in the Last Year: Never true   Transportation Needs: No Transportation Needs    Lack of Transportation (Medical):  No    ARTHROPLASTY  12/2/2013    right side     WRIST GANGLION EXCISION      right     Family History   Problem Relation Age of Onset    Cancer Father         lung    Heart Disease Father     High Blood Pressure Father     Diabetes Father         diet controlled diabetes     Stroke Father    [employer] Alzheimer's Disease Mother    [employer] Parkinsonism Mother     Other Mother         co[d     No Known Problems Maternal Grandmother     No Known Problems Maternal Grandfather     No Known Problems Paternal Grandmother     No Known Problems Paternal Grandfather     Diabetes Paternal [de-identified]     Other Sister         on thyroid medication      Review of Systems   Constitutional: Positive for fatigue (meds and heart ). Negative for activity change, fever and unexpected weight change. HENT: Positive for congestion, ear pain, nosebleeds, postnasal drip, rhinorrhea and sinus pressure. Negative for hearing loss, sneezing, sore throat and trouble swallowing. Eyes: Negative for visual disturbance. Respiratory: Positive for cough. Negative for shortness of breath and wheezing. Cardiovascular: Positive for palpitations. Negative for chest pain and leg swelling. Gastrointestinal: Positive for constipation. Negative for abdominal pain, blood in stool, diarrhea, nausea and vomiting. Endocrine: Negative for cold intolerance and heat intolerance. Breast exams negative    Genitourinary: Negative for dysuria, hematuria, vaginal bleeding and vaginal discharge. Vaginal dryness    Musculoskeletal: Positive for arthralgias, back pain and neck pain. Skin:        No concerning skin lesions   conitnued hair loss    Neurological: Positive for dizziness and light-headedness. Negative for syncope, speech difficulty, weakness, numbness and headaches. Hematological: Bruises/bleeds easily (bruises easily ). Psychiatric/Behavioral: Negative for dysphoric mood and sleep disturbance.        OBJECTIVE:  /64 (Site: Left Upper Arm, Position: Sitting, Cuff Size: Medium Adult)   Pulse 74   Temp 97.6 °F (36.4 °C) (Temporal)   Ht 4' 9\" (1.448 m)   Wt 140 lb (63.5 kg)   LMP  (LMP Unknown)   SpO2 98%   BMI 30.30 kg/m²      Body mass index is 30.3 kg/m². Physical Exam  Constitutional:       General: She is not in acute distress. Appearance: Normal appearance. She is well-developed. She is not diaphoretic. HENT:      Head: Normocephalic and atraumatic. Right Ear: Tympanic membrane and ear canal normal.      Left Ear: Tympanic membrane and ear canal normal.      Nose: Nose normal.      Mouth/Throat:      Pharynx: Oropharynx is clear. Eyes:      Conjunctiva/sclera: Conjunctivae normal.   Neck:      Thyroid: No thyroid mass or thyromegaly. Vascular: No carotid bruit. Cardiovascular:      Rate and Rhythm: Normal rate and regular rhythm. Heart sounds: Normal heart sounds. No murmur heard. No friction rub. No gallop. Pulmonary:      Effort: Pulmonary effort is normal. No tachypnea or accessory muscle usage. Breath sounds: Normal breath sounds. No decreased breath sounds. Chest:      Chest wall: No tenderness. Abdominal:      General: There is no distension. Palpations: Abdomen is soft. Tenderness: There is no abdominal tenderness. Comments: No hepatosplenomegaly   Musculoskeletal:         General: No swelling. Cervical back: Neck supple. Muscular tenderness (Posterior) present. Lymphadenopathy:      Cervical: No cervical adenopathy. Upper Body:      Right upper body: No supraclavicular adenopathy. Left upper body: No supraclavicular adenopathy. Skin:     General: Skin is warm and dry. Findings: No rash. Neurological:      General: No focal deficit present. Mental Status: She is alert. Gait: Gait normal.   Psychiatric:         Mood and Affect: Mood normal.         Speech: Speech normal.         Behavior: Behavior normal.         Thought Content:  Thought content normal.         ASSESSMENT/PLAN:    Jerrica Marvin was seen today for annual exam, sinus problem and allergies. Diagnoses and all orders for this visit:    Annual physical exam    Acquired hypothyroidism recent labs fine     Mild intermittent asthma without complication   Allergies   -     loratadine (CLARITIN) 10 MG tablet; Take 1 tablet by mouth daily    SSS (sick sinus syndrome) (HCC) pacer     intermittent atrial flutter  [facility]     ASD (atrial septal defect)    Encounter for screening mammogram for malignant neoplasm of breast  -     XOCHITL DIGITAL SCREEN W OR WO CAD BILATERAL; Future        Orders Placed This Encounter   Medications    loratadine (CLARITIN) 10 MG tablet     Sig: Take 1 tablet by mouth daily     Dispense:  30 tablet     Refill:  1        Return in about 1 year (around 8/26/2022), or if symptoms worsen or fail to improve, for physical .     There are no Patient Instructions on file for this visit.

## 2021-11-08 ENCOUNTER — OFFICE VISIT (OUTPATIENT)
Dept: FAMILY MEDICINE CLINIC | Age: 66
End: 2021-11-08
Payer: MEDICARE

## 2021-11-08 VITALS
BODY MASS INDEX: 30.42 KG/M2 | SYSTOLIC BLOOD PRESSURE: 120 MMHG | WEIGHT: 141 LBS | DIASTOLIC BLOOD PRESSURE: 82 MMHG | TEMPERATURE: 98.4 F | HEIGHT: 57 IN

## 2021-11-08 DIAGNOSIS — N39.0 URINARY TRACT INFECTION WITHOUT HEMATURIA, SITE UNSPECIFIED: Primary | ICD-10-CM

## 2021-11-08 DIAGNOSIS — J45.909 EXTRINSIC ASTHMA WITHOUT COMPLICATION, UNSPECIFIED ASTHMA SEVERITY, UNSPECIFIED WHETHER PERSISTENT: ICD-10-CM

## 2021-11-08 DIAGNOSIS — J01.10 ACUTE FRONTAL SINUSITIS, RECURRENCE NOT SPECIFIED: ICD-10-CM

## 2021-11-08 LAB
BILIRUBIN, POC: NORMAL
BLOOD URINE, POC: NORMAL
CLARITY, POC: CLEAR
COLOR, POC: NORMAL
GLUCOSE URINE, POC: NORMAL
KETONES, POC: NORMAL
LEUKOCYTE EST, POC: NORMAL
NITRITE, POC: NORMAL
PH, POC: 6
PROTEIN, POC: NORMAL
SPECIFIC GRAVITY, POC: 1
UROBILINOGEN, POC: 0.2

## 2021-11-08 PROCEDURE — 81002 URINALYSIS NONAUTO W/O SCOPE: CPT | Performed by: NURSE PRACTITIONER

## 2021-11-08 PROCEDURE — 99213 OFFICE O/P EST LOW 20 MIN: CPT | Performed by: NURSE PRACTITIONER

## 2021-11-08 RX ORDER — METHYLPREDNISOLONE 4 MG/1
TABLET ORAL
Qty: 1 KIT | Refills: 0 | Status: SHIPPED | OUTPATIENT
Start: 2021-11-08 | End: 2021-11-14

## 2021-11-08 RX ORDER — CEFUROXIME AXETIL 250 MG/1
250 TABLET ORAL 2 TIMES DAILY
Qty: 20 TABLET | Refills: 0 | Status: SHIPPED | OUTPATIENT
Start: 2021-11-08 | End: 2021-11-18

## 2021-11-08 ASSESSMENT — ENCOUNTER SYMPTOMS
SHORTNESS OF BREATH: 1
SINUS PAIN: 1
ABDOMINAL PAIN: 0
NAUSEA: 0
COUGH: 1
SORE THROAT: 0
RHINORRHEA: 1
DIARRHEA: 0
SINUS PRESSURE: 1
WHEEZING: 0

## 2021-11-08 NOTE — PATIENT INSTRUCTIONS
and go to all appointments, and call your doctor if you are having problems. It's also a good idea to know your test results and keep a list of the medicines you take. How can you care for yourself at home? · Take your antibiotics as directed. Do not stop taking them just because you feel better. You need to take the full course of antibiotics. · Drink extra water and other fluids for the next day or two. This will help make the urine less concentrated and help wash out the bacteria that are causing the infection. (If you have kidney, heart, or liver disease and have to limit fluids, talk with your doctor before you increase the amount of fluids you drink.)  · Avoid drinks that are carbonated or have caffeine. They can irritate the bladder. · Urinate often. Try to empty your bladder each time. · To relieve pain, take a hot bath or lay a heating pad set on low over your lower belly or genital area. Never go to sleep with a heating pad in place. To prevent UTIs  · Drink plenty of water each day. This helps you urinate often, which clears bacteria from your system. (If you have kidney, heart, or liver disease and have to limit fluids, talk with your doctor before you increase the amount of fluids you drink.)  · Urinate when you need to. · If you are sexually active, urinate right after you have sex. · Change sanitary pads often. · Avoid douches, bubble baths, feminine hygiene sprays, and other feminine hygiene products that have deodorants. · After going to the bathroom, wipe from front to back. When should you call for help? Call your doctor now or seek immediate medical care if:    · Symptoms such as fever, chills, nausea, or vomiting get worse or appear for the first time.     · You have new pain in your back just below your rib cage. This is called flank pain.     · There is new blood or pus in your urine.     · You have any problems with your antibiotic medicine.    Watch closely for changes in your health, and be sure to contact your doctor if:    · You are not getting better after taking an antibiotic for 2 days.     · Your symptoms go away but then come back. Where can you learn more? Go to https://chyokasta.RadioRx. org and sign in to your Kukupia account. Enter N840 in the [facility] box to learn more about \"Urinary Tract Infection (UTI) in Women: Care Instructions. \"     If you do not have an account, please click on the \"Sign Up Now\" link. Current as of: February 10, 2021               Content Version: 13.0  © 9987-0713 [employer]. Care instructions adapted under license by [facility] ([facility]). If you have questions about a medical condition or this instruction, always ask your healthcare professional. [address] any warranty or liability for your use of this information. Patient Education        Controlling Your Asthma: Care Instructions  Your Care Instructions     Asthma is a long-term condition that affects your breathing. It causes the airways that lead to the lungs to swell. During an asthma attack, the airways swell and narrow. This makes it hard to breathe. You may wheeze or cough. If you have a bad attack, you may need emergency care. There are two things to do to treat asthma. · Control asthma over the long term. · Treat attacks when they occur. You and your doctor can make an asthma action plan. It tells you what medicines you need to take every day to control asthma symptoms and what to do if you have an asthma attack. Your asthma action plan can help prevent and treat attacks. When you keep your asthma under control, you can prevent severe attacks and lasting damage to your airways. You need to treat your asthma even when you are not having symptoms. Although asthma is a lifelong disease, treatment can help control it and help you stay healthy. Follow-up care is a key part of your treatment and safety.  Be sure to make and go to all appointments, and call your doctor if you are having problems. It's also a good idea to know your test results and keep a list of the medicines you take. How can you care for yourself at home? To control asthma over the long term  Medicines   Controller medicines reduce swelling in your lungs. They also prevent asthma attacks. Take your controller medicine exactly as prescribed. Talk to your doctor if you have any problems with your medicine. · Inhaled corticosteroid is a common and effective controller medicine. Using it the right way can prevent or reduce most side effects. · Take your controller medicine every day, not just when you have symptoms. It helps prevent problems before they occur. · Your doctor may prescribe another medicine that you use along with the corticosteroid. This is often a long-acting bronchodilator. Do not take this medicine by itself. Using a long-acting bronchodilator by itself can increase your risk of a severe or fatal asthma attack. · Do not take inhaled corticosteroids or long-acting bronchodilators to stop an asthma attack that has already started. They don't work fast enough to help. · Talk to your doctor before you use other medicines. Some medicines, such as aspirin, can cause asthma attacks in some people. Education   · Learn what triggers an asthma attack. Avoid these triggers when you can. Common triggers include colds, smoke, air pollution, dust, pollen, mold, pets, cockroaches, stress, and cold air. · Check yourself for asthma symptoms to know which step to follow in your action plan. Watch for things like being short of breath, having chest tightness, coughing, and wheezing. Also notice if symptoms wake you up at night or if you get tired quickly when you exercise. · If you have a peak flow meter, use it to check how well you are breathing. It can help you know when an asthma attack is going to occur.  Then you can take medicine to prevent the asthma attack or make it less severe. · Do not smoke or allow others to smoke around you. Avoid smoky places. Smoking makes asthma worse. If you need help quitting, talk to your doctor about stop-smoking programs and medicines. These can increase your chances of quitting for good. · Avoid colds and the flu. Get a pneumococcal vaccine shot. If you have had one before, ask your doctor whether you need a second dose. Get a flu vaccine every year, as soon as it's available. If you must be around people with colds or the flu, wash your hands often. To treat attacks when they occur  Use your asthma action plan when you have an attack. Your quick-relief medicine will stop an asthma attack. It relaxes the muscles that get tight around the airways. If your doctor prescribed corticosteroid pills to use during an attack, take them as directed. They may take hours to work, but they may shorten the attack and help you breathe better. · Albuterol is an effective quick-relief inhaler. · Take your quick-relief medicine exactly as prescribed. · Always bring your asthma medicine with you when you travel. · You may need to use quick-relief medicine before you exercise. · Call your doctor if you think you are having a problem with your medicine. When should you call for help? Call 911 anytime you think you may need emergency care. For example, call if:    · You are having severe trouble breathing. Call your doctor now or seek immediate medical care if:    · Your symptoms do not get better after you have followed your asthma action plan.     · You cough up yellow, dark brown, or bloody mucus (sputum). Watch closely for changes in your health, and be sure to contact your doctor if:    · Your coughing and wheezing get worse.     · You need to use your quick-relief medicine on more than 2 days a week within a month (unless it is just for exercise).     · You need help figuring out what is triggering your asthma attacks.    Where can you learn more? Go to https://chpepiceweb.Referral.IM. org and sign in to your TeachScape account. Enter S894 in the dbTwang box to learn more about \"Controlling Your Asthma: Care Instructions. \"     If you do not have an account, please click on the \"Sign Up Now\" link. Current as of: October 26, 2020               Content Version: 12.9  © 2006-2021 Healthwise, [facility]. Care instructions adapted under license by [facility] ([facility]). If you have questions about a medical condition or this instruction, always ask your healthcare professional. [address] any warranty or liability for your use of this information.

## 2021-11-08 NOTE — PROGRESS NOTES
Norma Spicer (:  1955) is a 73 y.o. female,Established patient, here for evaluation of the following chief complaint(s):  Urinary Tract Infection (burning, pressure, pain) and Asthma (using inhaler alot)         ASSESSMENT/PLAN:  1. Urinary tract infection without hematuria, site unspecified  Start antibiotic today follow pt education and instructions included in AVS.   - POCT Urinalysis no Micro  - Culture, Urine    2. Acute frontal sinusitis, recurrence not specified  Start antibiotic today, follow pt education and instructions. Use flonase BID    3. Extrinsic asthma without complication, unspecified asthma severity, unspecified whether persistent  Start steroid dose pack, keep diary of triggers. Return if symptoms worsen or fail to improve. Subjective   SUBJECTIVE/OBJECTIVE:  HPI  Presents today for complaints of intermittent dysuria for the last week, now constant. Positive for urgency and frequency. Denies flank pain or blood in urine. Also complaining of seasonal allergies, lots of post nasal drip, states certain smells are triggering her asthma. sinsus pain in her left eye, with yellow drainage. Current Outpatient Medications   Medication Sig Dispense Refill    cefUROXime (CEFTIN) 250 MG tablet Take 1 tablet by mouth 2 times daily for 10 days 20 tablet 0    methylPREDNISolone (MEDROL DOSEPACK) 4 MG tablet Take by mouth.  1 kit 0    rivaroxaban (XARELTO) 20 MG TABS tablet Take 20 mg by mouth      levothyroxine (SYNTHROID) 50 MCG tablet TAKE ONE TABLET BY MOUTH DAILY 90 tablet 0    montelukast (SINGULAIR) 10 MG tablet TAKE ONE TABLET BY MOUTH ONCE NIGHTLY 90 tablet 2    mirabegron (MYRBETRIQ) 50 MG TB24 Take 50 mg by mouth daily      nystatin (MYCOSTATIN) 061437 units TABS Take 1 tablet by mouth 2 times daily 60 tablet 1    albuterol sulfate  (90 Base) MCG/ACT inhaler Inhale 2 puffs into the lungs every 6 hours as needed for Wheezing or Shortness of Breath 1 Inhaler 3    estradiol (ESTRING) 2 MG vaginal ring Place 2 mg vaginally once Follow package directions.  fluticasone (FLONASE) 50 MCG/ACT nasal spray 1 spray by Nasal route daily 1 Bottle 11    Multiple Vitamins-Minerals (THERAPEUTIC MULTIVITAMIN-MINERALS) tablet Take 1 tablet by mouth daily.  Calcium Carbonate-Vitamin D (CALCIUM 600+D3 PO) Take 1 tablet by mouth 2 times daily       Biotin 2500 MCG CAPS Take  by mouth 2 times daily. Current Facility-Administered Medications   Medication Dose Route Frequency Provider Last Rate Last Admin    betamethasone acetate-betamethasone sodium phosphate (CELESTONE) injection 12 mg  12 mg Intra-artICUlar Once Rand Levin MD            Past Medical History:   Diagnosis Date    Allergic rhinitis     Asthma     Atrial flutter ([address]     Atrial septal defect     Constipation     Environmental allergies     H/O degenerative disc disease     Hyperlipidemia     borderline    Hypothyroidism     Pacemaker     Reflux     SSS (sick sinus syndrome) ([facility])         Review of Systems   Constitutional: Negative for activity change, fatigue and fever. HENT: Positive for congestion, postnasal drip, rhinorrhea, sinus pressure and sinus pain. Negative for sore throat and tinnitus. Respiratory: Positive for cough and shortness of breath. Negative for wheezing. Cardiovascular: Negative for chest pain, palpitations and leg swelling. Gastrointestinal: Negative for abdominal pain, diarrhea and nausea. Genitourinary: Positive for dysuria, frequency and urgency. Objective   Physical Exam  Constitutional:       General: She is not in acute distress. Appearance: She is obese. She is not ill-appearing. Cardiovascular:      Rate and Rhythm: Normal rate and regular rhythm. Pulses: Normal pulses. Heart sounds: Normal heart sounds. Pulmonary:      Effort: Pulmonary effort is normal.      Breath sounds: Normal air entry.  Examination of the left-upper field reveals wheezing. Wheezing present. Neurological:      Mental Status: She is alert.               --HIWOT Kaminski - NP

## 2021-11-10 LAB
ORGANISM: ABNORMAL
URINE CULTURE, ROUTINE: ABNORMAL

## 2021-12-01 DIAGNOSIS — J45.20 MILD INTERMITTENT ASTHMA WITHOUT COMPLICATION: ICD-10-CM

## 2021-12-01 DIAGNOSIS — E03.9 ACQUIRED HYPOTHYROIDISM: ICD-10-CM

## 2021-12-01 RX ORDER — ALBUTEROL SULFATE 90 UG/1
2 AEROSOL, METERED RESPIRATORY (INHALATION) EVERY 6 HOURS PRN
Qty: 1 EACH | Refills: 5 | Status: SHIPPED | OUTPATIENT
Start: 2021-12-01 | End: 2022-08-29 | Stop reason: SDUPTHER

## 2021-12-01 RX ORDER — LEVOTHYROXINE SODIUM 0.05 MG/1
TABLET ORAL
Qty: 90 TABLET | Refills: 1 | Status: SHIPPED | OUTPATIENT
Start: 2021-12-01 | End: 2022-05-31

## 2022-02-22 ENCOUNTER — OFFICE VISIT (OUTPATIENT)
Dept: FAMILY MEDICINE CLINIC | Age: 67
End: 2022-02-22
Payer: MEDICARE

## 2022-02-22 VITALS
OXYGEN SATURATION: 99 % | WEIGHT: 139 LBS | TEMPERATURE: 98.3 F | HEIGHT: 57 IN | BODY MASS INDEX: 29.99 KG/M2 | DIASTOLIC BLOOD PRESSURE: 70 MMHG | SYSTOLIC BLOOD PRESSURE: 124 MMHG | HEART RATE: 71 BPM

## 2022-02-22 DIAGNOSIS — N39.0 URINARY TRACT INFECTION WITHOUT HEMATURIA, SITE UNSPECIFIED: Primary | ICD-10-CM

## 2022-02-22 LAB
BILIRUBIN, POC: NORMAL
BLOOD URINE, POC: NORMAL
CLARITY, POC: CLEAR
COLOR, POC: YELLOW
GLUCOSE URINE, POC: NORMAL
KETONES, POC: NORMAL
LEUKOCYTE EST, POC: NORMAL
NITRITE, POC: NORMAL
PH, POC: 6
PROTEIN, POC: NORMAL
SPECIFIC GRAVITY, POC: 1
UROBILINOGEN, POC: 0.2

## 2022-02-22 PROCEDURE — 99213 OFFICE O/P EST LOW 20 MIN: CPT | Performed by: NURSE PRACTITIONER

## 2022-02-22 PROCEDURE — 81002 URINALYSIS NONAUTO W/O SCOPE: CPT | Performed by: NURSE PRACTITIONER

## 2022-02-22 RX ORDER — METHENAMINE, SODIUM PHOSPHATE, MONOBASIC, METHYLENE BLUE, AND HYOSCYAMINE SULFATE 81.6; 40.8; 10.8; .12 MG/1; MG/1; MG/1; MG/1
TABLET, COATED ORAL
COMMUNITY
End: 2022-02-22 | Stop reason: SDUPTHER

## 2022-02-22 RX ORDER — NYSTATIN 500000 [USP'U]/1
500000 TABLET, COATED ORAL 2 TIMES DAILY
Qty: 60 TABLET | Refills: 2 | Status: SHIPPED | OUTPATIENT
Start: 2022-02-22 | End: 2022-05-31

## 2022-02-22 RX ORDER — METHENAMINE, SODIUM PHOSPHATE, MONOBASIC, METHYLENE BLUE, AND HYOSCYAMINE SULFATE 81.6; 40.8; 10.8; .12 MG/1; MG/1; MG/1; MG/1
1 TABLET, COATED ORAL 2 TIMES DAILY
Qty: 60 TABLET | Refills: 1 | Status: SHIPPED | OUTPATIENT
Start: 2022-02-22 | End: 2022-05-23

## 2022-02-22 RX ORDER — NITROFURANTOIN 25; 75 MG/1; MG/1
100 CAPSULE ORAL 2 TIMES DAILY
Qty: 20 CAPSULE | Refills: 0 | Status: SHIPPED | OUTPATIENT
Start: 2022-02-22 | End: 2022-03-04

## 2022-02-22 NOTE — PATIENT INSTRUCTIONS
Patient Education        Urinary Tract Infection (UTI) in Women: Care Instructions  Overview     A urinary tract infection, or UTI, is a general term for an infection anywhere between the kidneys and the urethra (where urine comes out). Most UTIs are bladder infections. They often cause pain or burning when you urinate. UTIs are caused by bacteria and can be cured with antibiotics. Be sure to complete your treatment so that the infection does not get worse. Follow-up care is a key part of your treatment and safety. Be sure to make and go to all appointments, and call your doctor if you are having problems. It's also a good idea to know your test results and keep a list of the medicines you take. How can you care for yourself at home? · Take your antibiotics as directed. Do not stop taking them just because you feel better. You need to take the full course of antibiotics. · Drink extra water and other fluids for the next day or two. This will help make the urine less concentrated and help wash out the bacteria that are causing the infection. (If you have kidney, heart, or liver disease and have to limit fluids, talk with your doctor before you increase the amount of fluids you drink.)  · Avoid drinks that are carbonated or have caffeine. They can irritate the bladder. · Urinate often. Try to empty your bladder each time. · To relieve pain, take a hot bath or lay a heating pad set on low over your lower belly or genital area. Never go to sleep with a heating pad in place. To prevent UTIs  · Drink plenty of water each day. This helps you urinate often, which clears bacteria from your system. (If you have kidney, heart, or liver disease and have to limit fluids, talk with your doctor before you increase the amount of fluids you drink.)  · Urinate when you need to. · If you are sexually active, urinate right after you have sex. · Change sanitary pads often.   · Avoid douches, bubble baths, feminine hygiene sprays, and other feminine hygiene products that have deodorants. · After going to the bathroom, wipe from front to back. When should you call for help? Call your doctor now or seek immediate medical care if:    · Symptoms such as fever, chills, nausea, or vomiting get worse or appear for the first time.     · You have new pain in your back just below your rib cage. This is called flank pain.     · There is new blood or pus in your urine.     · You have any problems with your antibiotic medicine. Watch closely for changes in your health, and be sure to contact your doctor if:    · You are not getting better after taking an antibiotic for 2 days.     · Your symptoms go away but then come back. Where can you learn more? Go to [email or web address] and sign in to your Starbak account. Enter W240 in the Re[employer]ise box to learn more about \"Urinary Tract Infection (UTI) in Women: Care Instructions. \"     If you do not have an account, please click on the \"Sign Up Now\" link. Current as of: February 10, 2021               Content Version: 13.1  © 4835-6136 Healthwise, [employer]. Care instructions adapted under license by [facility] ([facility]). If you have questions about a medical condition or this instruction, always ask your healthcare professional. [address] any warranty or liability for your use of this information.

## 2022-02-22 NOTE — PROGRESS NOTES
Luis Junior (:  1955) is a 73 y.o. female,Established patient, here for evaluation of the following chief complaint(s):  Medication Refill and Cystitis (burns and pain)         ASSESSMENT/PLAN:  1. Urinary tract infection without hematuria, site unspecified  Take medications as prescribed. Follow pt education and instructions included in AVS.   - POCT Urinalysis no Micro  - Culture, Urine       Return if symptoms worsen or fail to improve. Subjective   SUBJECTIVE/OBJECTIVE:  HPI   Presents to office today for urinary burning and dysruia for the last week. States she needs a refill on meds that her GYN normally fills due to GYN being out of network with insurance. History of UTI and symptoms are similar. Denies fever, abd pain, n/v or flank pain. Current Outpatient Medications   Medication Sig Dispense Refill    nystatin (MYCOSTATIN) 636050 units TABS Take 1 tablet by mouth 2 times daily 60 tablet 2    Methen-Hyosc-Meth Blue-Na Phos (UROGESIC-BLUE) 81.6 MG TABS Take 1 tablet by mouth in the morning and at bedtime 60 tablet 1    nitrofurantoin, macrocrystal-monohydrate, (MACROBID) 100 MG capsule Take 1 capsule by mouth 2 times daily for 10 days 20 capsule 0    levothyroxine (SYNTHROID) 50 MCG tablet TAKE ONE TABLET BY MOUTH DAILY 90 tablet 1    albuterol sulfate  (90 Base) MCG/ACT inhaler Inhale 2 puffs into the lungs every 6 hours as needed for Wheezing or Shortness of Breath 1 each 5    rivaroxaban (XARELTO) 20 MG TABS tablet Take 20 mg by mouth      montelukast (SINGULAIR) 10 MG tablet TAKE ONE TABLET BY MOUTH ONCE NIGHTLY 90 tablet 2    mirabegron (MYRBETRIQ) 50 MG TB24 Take 50 mg by mouth daily      fluticasone (FLONASE) 50 MCG/ACT nasal spray 1 spray by Nasal route daily 1 Bottle 11    Multiple Vitamins-Minerals (THERAPEUTIC MULTIVITAMIN-MINERALS) tablet Take 1 tablet by mouth daily.       Calcium Carbonate-Vitamin D (CALCIUM 600+D3 PO) Take 1 tablet by mouth 2 times daily       Biotin 2500 MCG CAPS Take  by mouth 2 times daily. No current facility-administered medications for this visit. Past Medical History:   Diagnosis Date    Allergic rhinitis     Asthma     Atrial flutter ([address]     Atrial septal defect     Constipation     Environmental allergies     H/O degenerative disc disease     Hyperlipidemia     borderline    Hypothyroidism     Pacemaker     Reflux     SSS (sick sinus syndrome) ([facility])         Review of Systems   Constitutional: Negative for activity change, fatigue and fever. Respiratory: Negative for cough, shortness of breath and wheezing. Cardiovascular: Negative for chest pain, palpitations and leg swelling. Gastrointestinal: Negative for abdominal pain, diarrhea, nausea and vomiting. Genitourinary: Positive for dysuria, frequency and urgency. Negative for difficulty urinating and flank pain. Objective   Physical Exam  Constitutional:       General: She is not in acute distress. Appearance: She is obese. She is not ill-appearing. HENT:      Head: Normocephalic and atraumatic. Cardiovascular:      Rate and Rhythm: Normal rate and regular rhythm. Pulses: Normal pulses. Heart sounds: Normal heart sounds. Pulmonary:      Effort: Pulmonary effort is normal.      Breath sounds: Normal breath sounds. No wheezing. Abdominal:      General: Bowel sounds are normal. There is no distension. Palpations: There is no mass. Tenderness: There is no abdominal tenderness. There is no right CVA tenderness or left CVA tenderness. Musculoskeletal:      Right lower leg: No edema. Left lower leg: No edema. Skin:     General: Skin is warm and dry. Capillary Refill: Capillary refill takes less than 2 seconds. Neurological:      Mental Status: She is alert.    Psychiatric:         Mood and Affect: Mood normal.              --HIWOT Tripp NP

## 2022-02-24 LAB
ORGANISM: ABNORMAL
ORGANISM: ABNORMAL
URINE CULTURE, ROUTINE: ABNORMAL
URINE CULTURE, ROUTINE: ABNORMAL

## 2022-02-24 ASSESSMENT — ENCOUNTER SYMPTOMS
VOMITING: 0
DIARRHEA: 0
COUGH: 0
SHORTNESS OF BREATH: 0
WHEEZING: 0
ABDOMINAL PAIN: 0
NAUSEA: 0

## 2022-04-27 DIAGNOSIS — J45.20 MILD INTERMITTENT ASTHMA WITHOUT COMPLICATION: ICD-10-CM

## 2022-04-27 RX ORDER — MONTELUKAST SODIUM 10 MG/1
TABLET ORAL
Qty: 90 TABLET | Refills: 2 | Status: SHIPPED | OUTPATIENT
Start: 2022-04-27 | End: 2022-08-29 | Stop reason: SDUPTHER

## 2022-05-28 DIAGNOSIS — E03.9 ACQUIRED HYPOTHYROIDISM: ICD-10-CM

## 2022-05-31 RX ORDER — NYSTATIN 500000 [USP'U]/1
TABLET, COATED ORAL
Qty: 60 TABLET | Refills: 2 | Status: SHIPPED | OUTPATIENT
Start: 2022-05-31 | End: 2022-08-29 | Stop reason: SDUPTHER

## 2022-05-31 RX ORDER — LEVOTHYROXINE SODIUM 0.05 MG/1
TABLET ORAL
Qty: 90 TABLET | Refills: 1 | Status: SHIPPED | OUTPATIENT
Start: 2022-05-31 | End: 2022-08-30 | Stop reason: SDUPTHER

## 2022-06-06 ENCOUNTER — TELEPHONE (OUTPATIENT)
Dept: OTHER | Facility: CLINIC | Age: 67
End: 2022-06-06

## 2022-06-06 NOTE — TELEPHONE ENCOUNTER
Pt was contacted today as part of [facility] to schedule a mammogram.         I left a message reminding the patient that they have an open order from Dr. Baldo Chowdhury and need to contact Central Scheduling to schedule a mammogram.    Thanks,  Rosy Ojeda LPN   [phone]

## 2022-06-09 RX ORDER — URINARY ANTISEPTIC ANTISPASMODIC 81.6; 40.8; 10.8; .12 MG/1; MG/1; MG/1; MG/1
TABLET ORAL
Qty: 60 TABLET | Refills: 5 | Status: SHIPPED | OUTPATIENT
Start: 2022-06-09

## 2022-08-29 ENCOUNTER — OFFICE VISIT (OUTPATIENT)
Dept: FAMILY MEDICINE CLINIC | Age: 67
End: 2022-08-29
Payer: MEDICARE

## 2022-08-29 VITALS
WEIGHT: 138 LBS | DIASTOLIC BLOOD PRESSURE: 78 MMHG | BODY MASS INDEX: 29.77 KG/M2 | TEMPERATURE: 98 F | HEART RATE: 89 BPM | SYSTOLIC BLOOD PRESSURE: 126 MMHG | OXYGEN SATURATION: 98 % | HEIGHT: 57 IN

## 2022-08-29 DIAGNOSIS — J45.20 MILD INTERMITTENT ASTHMA WITHOUT COMPLICATION: ICD-10-CM

## 2022-08-29 DIAGNOSIS — Z00.00 WELCOME TO MEDICARE PREVENTIVE VISIT: Primary | ICD-10-CM

## 2022-08-29 DIAGNOSIS — I49.5 SSS (SICK SINUS SYNDROME) (HCC): ICD-10-CM

## 2022-08-29 DIAGNOSIS — L65.9 HAIR LOSS: ICD-10-CM

## 2022-08-29 DIAGNOSIS — E03.9 ACQUIRED HYPOTHYROIDISM: ICD-10-CM

## 2022-08-29 DIAGNOSIS — R20.0 LEFT ARM NUMBNESS: ICD-10-CM

## 2022-08-29 DIAGNOSIS — J45.42: ICD-10-CM

## 2022-08-29 DIAGNOSIS — I48.92 ATRIAL FLUTTER, UNSPECIFIED TYPE (HCC): ICD-10-CM

## 2022-08-29 DIAGNOSIS — R30.0 DYSURIA: ICD-10-CM

## 2022-08-29 LAB
A/G RATIO: 2.2 (ref 1.1–2.2)
ALBUMIN SERPL-MCNC: 4.6 G/DL (ref 3.4–5)
ALP BLD-CCNC: 76 U/L (ref 40–129)
ALT SERPL-CCNC: 17 U/L (ref 10–40)
ANION GAP SERPL CALCULATED.3IONS-SCNC: 12 MMOL/L (ref 3–16)
AST SERPL-CCNC: 24 U/L (ref 15–37)
BASOPHILS ABSOLUTE: 0 K/UL (ref 0–0.2)
BASOPHILS RELATIVE PERCENT: 0.7 %
BILIRUB SERPL-MCNC: 0.3 MG/DL (ref 0–1)
BILIRUBIN, POC: NEGATIVE
BLOOD URINE, POC: NEGATIVE
BUN BLDV-MCNC: 12 MG/DL (ref 7–20)
CALCIUM SERPL-MCNC: 10 MG/DL (ref 8.3–10.6)
CHLORIDE BLD-SCNC: 102 MMOL/L (ref 99–110)
CLARITY, POC: CLEAR
CO2: 26 MMOL/L (ref 21–32)
COLOR, POC: YELLOW
CREAT SERPL-MCNC: 0.6 MG/DL (ref 0.6–1.2)
EOSINOPHILS ABSOLUTE: 0.1 K/UL (ref 0–0.6)
EOSINOPHILS RELATIVE PERCENT: 1.6 %
GFR AFRICAN AMERICAN: >60
GFR NON-AFRICAN AMERICAN: >60
GLUCOSE BLD-MCNC: 74 MG/DL (ref 70–99)
GLUCOSE URINE, POC: NEGATIVE
HCT VFR BLD CALC: 38 % (ref 36–48)
HEMOGLOBIN: 12.5 G/DL (ref 12–16)
KETONES, POC: NEGATIVE
LEUKOCYTE EST, POC: NORMAL
LYMPHOCYTES ABSOLUTE: 1.9 K/UL (ref 1–5.1)
LYMPHOCYTES RELATIVE PERCENT: 32.8 %
MCH RBC QN AUTO: 30.2 PG (ref 26–34)
MCHC RBC AUTO-ENTMCNC: 33 G/DL (ref 31–36)
MCV RBC AUTO: 91.7 FL (ref 80–100)
MONOCYTES ABSOLUTE: 0.6 K/UL (ref 0–1.3)
MONOCYTES RELATIVE PERCENT: 9.7 %
NEUTROPHILS ABSOLUTE: 3.2 K/UL (ref 1.7–7.7)
NEUTROPHILS RELATIVE PERCENT: 55.2 %
NITRITE, POC: NEGATIVE
PDW BLD-RTO: 15.1 % (ref 12.4–15.4)
PH, POC: 7.5
PLATELET # BLD: 243 K/UL (ref 135–450)
PMV BLD AUTO: 9.2 FL (ref 5–10.5)
POTASSIUM SERPL-SCNC: 3.5 MMOL/L (ref 3.5–5.1)
PROTEIN, POC: NEGATIVE
RBC # BLD: 4.14 M/UL (ref 4–5.2)
SODIUM BLD-SCNC: 140 MMOL/L (ref 136–145)
SPECIFIC GRAVITY, POC: 1.01
TOTAL PROTEIN: 6.7 G/DL (ref 6.4–8.2)
TSH SERPL DL<=0.05 MIU/L-ACNC: 1.05 UIU/ML (ref 0.27–4.2)
UROBILINOGEN, POC: NORMAL
WBC # BLD: 5.8 K/UL (ref 4–11)

## 2022-08-29 PROCEDURE — 90471 IMMUNIZATION ADMIN: CPT | Performed by: INTERNAL MEDICINE

## 2022-08-29 PROCEDURE — 81002 URINALYSIS NONAUTO W/O SCOPE: CPT | Performed by: INTERNAL MEDICINE

## 2022-08-29 PROCEDURE — 90677 PCV20 VACCINE IM: CPT | Performed by: INTERNAL MEDICINE

## 2022-08-29 PROCEDURE — 1123F ACP DISCUSS/DSCN MKR DOCD: CPT | Performed by: INTERNAL MEDICINE

## 2022-08-29 PROCEDURE — G0402 INITIAL PREVENTIVE EXAM: HCPCS | Performed by: INTERNAL MEDICINE

## 2022-08-29 RX ORDER — FLUTICASONE PROPIONATE 110 UG/1
2 AEROSOL, METERED RESPIRATORY (INHALATION) 2 TIMES DAILY
Qty: 1 EACH | Refills: 3 | Status: SHIPPED | OUTPATIENT
Start: 2022-08-29 | End: 2023-08-29

## 2022-08-29 RX ORDER — MONTELUKAST SODIUM 10 MG/1
10 TABLET ORAL NIGHTLY
Qty: 90 TABLET | Refills: 3 | Status: SHIPPED | OUTPATIENT
Start: 2022-08-29

## 2022-08-29 RX ORDER — NYSTATIN 500000 [USP'U]/1
TABLET, COATED ORAL
Qty: 180 TABLET | Refills: 3 | Status: SHIPPED | OUTPATIENT
Start: 2022-08-29

## 2022-08-29 RX ORDER — ALBUTEROL SULFATE 90 UG/1
2 AEROSOL, METERED RESPIRATORY (INHALATION) EVERY 6 HOURS PRN
Qty: 1 EACH | Refills: 5 | Status: SHIPPED | OUTPATIENT
Start: 2022-08-29

## 2022-08-29 SDOH — ECONOMIC STABILITY: FOOD INSECURITY: WITHIN THE PAST 12 MONTHS, YOU WORRIED THAT YOUR FOOD WOULD RUN OUT BEFORE YOU GOT MONEY TO BUY MORE.: NEVER TRUE

## 2022-08-29 SDOH — ECONOMIC STABILITY: FOOD INSECURITY: WITHIN THE PAST 12 MONTHS, THE FOOD YOU BOUGHT JUST DIDN'T LAST AND YOU DIDN'T HAVE MONEY TO GET MORE.: NEVER TRUE

## 2022-08-29 ASSESSMENT — PATIENT HEALTH QUESTIONNAIRE - PHQ9
8. MOVING OR SPEAKING SO SLOWLY THAT OTHER PEOPLE COULD HAVE NOTICED. OR THE OPPOSITE, BEING SO FIGETY OR RESTLESS THAT YOU HAVE BEEN MOVING AROUND A LOT MORE THAN USUAL: 0
1. LITTLE INTEREST OR PLEASURE IN DOING THINGS: 0
10. IF YOU CHECKED OFF ANY PROBLEMS, HOW DIFFICULT HAVE THESE PROBLEMS MADE IT FOR YOU TO DO YOUR WORK, TAKE CARE OF THINGS AT HOME, OR GET ALONG WITH OTHER PEOPLE: 0
SUM OF ALL RESPONSES TO PHQ9 QUESTIONS 1 & 2: 0
4. FEELING TIRED OR HAVING LITTLE ENERGY: 1
9. THOUGHTS THAT YOU WOULD BE BETTER OFF DEAD, OR OF HURTING YOURSELF: 0
SUM OF ALL RESPONSES TO PHQ QUESTIONS 1-9: 2
5. POOR APPETITE OR OVEREATING: 0
SUM OF ALL RESPONSES TO PHQ QUESTIONS 1-9: 2
2. FEELING DOWN, DEPRESSED OR HOPELESS: 0
SUM OF ALL RESPONSES TO PHQ QUESTIONS 1-9: 2
3. TROUBLE FALLING OR STAYING ASLEEP: 1
7. TROUBLE CONCENTRATING ON THINGS, SUCH AS READING THE NEWSPAPER OR WATCHING TELEVISION: 0
6. FEELING BAD ABOUT YOURSELF - OR THAT YOU ARE A FAILURE OR HAVE LET YOURSELF OR YOUR FAMILY DOWN: 0
SUM OF ALL RESPONSES TO PHQ QUESTIONS 1-9: 2

## 2022-08-29 ASSESSMENT — ENCOUNTER SYMPTOMS
ABDOMINAL PAIN: 0
WHEEZING: 0
BACK PAIN: 1
COUGH: 1
VOICE CHANGE: 0
SINUS PRESSURE: 1
ROS SKIN COMMENTS: NO CONCERNING SKIN LESIONS
VOMITING: 0
DIARRHEA: 0
RHINORRHEA: 1
SHORTNESS OF BREATH: 1
BLOOD IN STOOL: 1
TROUBLE SWALLOWING: 0
NAUSEA: 0
CONSTIPATION: 0

## 2022-08-29 ASSESSMENT — LIFESTYLE VARIABLES
HOW OFTEN DO YOU HAVE A DRINK CONTAINING ALCOHOL: MONTHLY OR LESS
HOW MANY STANDARD DRINKS CONTAINING ALCOHOL DO YOU HAVE ON A TYPICAL DAY: 1 OR 2

## 2022-08-29 ASSESSMENT — SOCIAL DETERMINANTS OF HEALTH (SDOH): HOW HARD IS IT FOR YOU TO PAY FOR THE VERY BASICS LIKE FOOD, HOUSING, MEDICAL CARE, AND HEATING?: NOT HARD AT ALL

## 2022-08-29 NOTE — PROGRESS NOTES
SUBJECTIVE:  Shukri Walls is a 73 y.o. female being evaluated for:    Chief Complaint   Patient presents with    Medicare AWV      Patient is here for medicare wellness today . Tingling      Patient is having tingling on left hand        HPI   Worsening numbness and tingling in her left hand  All time   Worse if lays in a certain way  Sometimes worse with certain activities    Does have neck problems  Swells up in the back     Itchy scalp and losing her hair Sinuses and allergies    Losing her hair     Losing it in the front    Feeling has lost half and is making her nervous   Cervical and lumbar disc disease injections did not help   Asthma and allergies worsening some nose bleeding on Flonase   Allergies   Allergen Reactions    Ciprofloxacin Nausea And Vomiting     Current Outpatient Medications   Medication Sig Dispense Refill    fluticasone (FLOVENT HFA) 110 MCG/ACT inhaler Inhale 2 puffs into the lungs 2 times daily 1 each 3    levothyroxine (SYNTHROID) 50 MCG tablet TAKE ONE TABLET BY MOUTH DAILY 90 tablet 1    nystatin (MYCOSTATIN) 631742 units TABS TAKE ONE TABLET BY MOUTH TWICE A DAY 60 tablet 2    montelukast (SINGULAIR) 10 MG tablet TAKE ONE TABLET BY MOUTH ONCE NIGHTLY 90 tablet 2    albuterol sulfate  (90 Base) MCG/ACT inhaler Inhale 2 puffs into the lungs every 6 hours as needed for Wheezing or Shortness of Breath 1 each 5    rivaroxaban (XARELTO) 20 MG TABS tablet Take 20 mg by mouth      mirabegron (MYRBETRIQ) 50 MG TB24 Take 50 mg by mouth daily      fluticasone (FLONASE) 50 MCG/ACT nasal spray 1 spray by Nasal route daily 1 Bottle 11    Multiple Vitamins-Minerals (THERAPEUTIC MULTIVITAMIN-MINERALS) tablet Take 1 tablet by mouth daily. Calcium Carbonate-Vitamin D (CALCIUM 600+D3 PO) Take 1 tablet by mouth 2 times daily       Biotin 2500 MCG CAPS Take  by mouth 2 times daily.       Methen-Hyosc-Meth Blue-Na Phos (ME/NAPHOS/MB/HYO1) 81.6 MG TABS TAKE ONE TABLET BY MOUTH EVERY MORNING AND TAKE ONE TABLET BY MOUTH EVERY NIGHT AT BEDTIME (Patient not taking: Reported on 2022) 60 tablet 5     No current facility-administered medications for this visit. Social History     Socioeconomic History    Marital status:      Spouse name: Not on file    Number of children: Not on file    Years of education: Not on file    Highest education level: Not on file   Occupational History    Not on file   Tobacco Use    Smoking status: Former     Packs/day: 0.50     Years: 8.00     Pack years: 4.00     Types: Cigarettes     Start date: 1972     Quit date: 1980     Years since quittin.6    Smokeless tobacco: Never   Vaping Use    Vaping Use: Never used   Substance and Sexual Activity    Alcohol use: Yes     Comment: rare, occ glass wine    Drug use: Never    Sexual activity: Not Currently     Partners: Male   Other Topics Concern    Not on file   Social History Narrative    Not on file     Social Determinants of Health     Financial Resource Strain: Low Risk     Difficulty of Paying Living Expenses: Not hard at all   Food Insecurity: No Food Insecurity    Worried About [address] in the Last Year: Never true    [address]  [email or web address] in the Last Year: Never true   Transportation Needs: No Transportation Needs    Lack of Transportation (Medical): No    Lack of Transportation (Non-Medical):  No   Physical Activity: Inactive    Days of Exercise per Week: 0 days    Minutes of Exercise per Session: 0 min   Stress: Not on file   Social Connections: Not on file   Intimate Partner Violence: Not on file   Housing Stability: Not on file      Past Medical History:   Diagnosis Date    Allergic rhinitis     Asthma     Atrial flutter ([facility]     Atrial septal defect     Constipation     Environmental allergies     H/O degenerative disc disease     Hypothyroidism     Pacemaker     Reflux     SSS (sick sinus syndrome) ([facility]      Past Surgical History:   Procedure Laterality Date    CARDIAC PACEMAKER PLACEMENT  2019    CARDIAC SURGERY      atrial septal defect     SECTION      x 3    COLONOSCOPY  2017    normal dr Susan Szymanski     COLONOSCOPY  3/15/12    WNL    CYSTOURETHROSCOPY/URETHRAL DILATION      HYSTERECTOMY (CERVIX STATUS UNKNOWN)  1985    complete AZEB BSO no cancer. for fibroid tumors    HYSTERECTOMY, TOTAL ABDOMINAL (CERVIX REMOVED)      with ooph for fibroids     JOINT REPLACEMENT Right     hip     PACEMAKER INSERTION      PAIN MANAGEMENT PROCEDURE Bilateral 10/22/2020    BILATERAL L2, L3, L4, L5 MEDIAL BRANCH BLOCK WITH FLUOROSCOPY (91697, 86609, 24158) performed by Bill Melton MD at [address] [place] [address] Bilateral 2020    BILATERAL L5 TRANSFORAMINAL EPIDURAL STEROID INJECTION WITH FLUOROSCOPY performed by Bill Melton MD at [address] [place] [address] N/A 2020    C7-T1 INTERLAMINAR EPIDURAL STEROID INJECTION WITH FLUOROSCOPY performed by Bill Melton MD at [place] [address]  2013    right side     WRIST GANGLION EXCISION      right     Family History   Problem Relation Age of Onset    Other Mother         COPD    Alzheimer's Disease Mother     Parkinsonism Mother     Cancer Father         lung    Heart Disease Father     High Blood Pressure Father     Diabetes Father         diet controlled diabetes     Stroke Father     Other Sister         on thyroid medication     Diabetes Paternal Aunt     No Known Problems Maternal Grandmother     No Known Problems Maternal Grandfather     No Known Problems Paternal Grandmother     No Known Problems Paternal Grandfather         Review of Systems   Constitutional:  Negative for activity change, fatigue and unexpected weight change. HENT:  Positive for congestion, ear pain (left), nosebleeds, postnasal drip, rhinorrhea and sinus pressure. Negative for trouble swallowing and voice change. Eyes:  Negative for visual disturbance.    Respiratory:  Positive for cough and shortness of breath. Negative for wheezing. Cardiovascular:  Positive for palpitations (atrial fib). Negative for chest pain and leg swelling. Gastrointestinal:  Positive for blood in stool (hemorrhoids). Negative for abdominal pain, constipation, diarrhea, nausea and vomiting. Heart burn all the time lives on generic tums    Endocrine: Negative for cold intolerance (hands and feet are cold) and heat intolerance. Self breast exams and nothing felt    Genitourinary:  Negative for dysuria and vaginal bleeding. Musculoskeletal:  Positive for arthralgias (everywhere  only uses tylenol), back pain and neck pain. Skin:         No concerning skin lesions    Neurological:  Positive for dizziness, light-headedness and headaches (sinus pressure on the left side). Negative for syncope, speech difficulty, weakness and numbness. Hematological:  Bruises/bleeds easily (on xarelto). Psychiatric/Behavioral:  Positive for sleep disturbance. Negative for dysphoric mood. OBJECTIVE:  /78 (Site: Right Upper Arm, Position: Sitting, Cuff Size: Medium Adult)   Pulse 89   Temp 98 °F (36.7 °C) (Oral)   Ht 4' 9\" (1.448 m)   Wt 138 lb (62.6 kg)   LMP  (LMP Unknown)   SpO2 98%   BMI 29.86 kg/m²      Body mass index is 29.86 kg/m². Physical Exam  Constitutional:       General: She is not in acute distress. Appearance: Normal appearance. She is well-developed. She is not diaphoretic. HENT:      Head: Normocephalic and atraumatic. Right Ear: Tympanic membrane and ear canal normal.      Left Ear: Tympanic membrane and ear canal normal.      Nose: Nose normal.      Mouth/Throat:      Pharynx: Oropharynx is clear. Eyes:      Conjunctiva/sclera: Conjunctivae normal.   Neck:      Thyroid: No thyroid mass or thyromegaly. Vascular: No carotid bruit. Cardiovascular:      Rate and Rhythm: Normal rate and regular rhythm. Heart sounds: Normal heart sounds. No murmur heard.     No friction rub. No gallop. Pulmonary:      Effort: Pulmonary effort is normal. No tachypnea or accessory muscle usage. Breath sounds: Normal breath sounds. No decreased breath sounds. Chest:      Chest wall: No tenderness. Breasts:     Right: No supraclavicular adenopathy. Left: No supraclavicular adenopathy. Abdominal:      General: There is no distension. Palpations: Abdomen is soft. Tenderness: There is no abdominal tenderness. Comments: No hepatosplenomegaly   Musculoskeletal:         General: No swelling. Cervical back: Neck supple. Muscular tenderness (Posterior) present. Lymphadenopathy:      Cervical: No cervical adenopathy. Upper Body:      Right upper body: No supraclavicular adenopathy. Left upper body: No supraclavicular adenopathy. Skin:     General: Skin is warm and dry. Findings: No rash. Comments: Thinning hair frontal bilaterally   Neurological:      General: No focal deficit present. Mental Status: She is alert. Gait: Gait normal.   Psychiatric:         Mood and Affect: Mood normal.         Speech: Speech normal.         Behavior: Behavior normal.         Thought Content: Thought content normal.       ASSESSMENT/PLAN:    [employer] was seen today for medicare awv and tingling. Diagnoses and all orders for this visit:    Welcome to Medicare preventive visit    Acquired hypothyroidism  -     TSH; Future    Hair loss check labs  consider spironolactone and if persisting referral to derm     Moderate persistent asthma with allergic rhinitis with status asthmaticus  -     fluticasone (FLOVENT HFA) 110 MCG/ACT inhaler; Inhale 2 puffs into the lungs 2 times daily    Dysuria  -     POCT Urinalysis no Micro    SSS (sick sinus syndrome) (HCC)  -     Comprehensive Metabolic Panel; Future    Atrial flutter, unspecified type ([address]  -     Comprehensive Metabolic Panel;  Future  -     CBC with Auto Differential; Future    Left arm numbness  - EMG; Future    vaccine  -     Pneumococcal, PCV20, PREVNAR 20, (age 25 yrs+), IM, PF        Orders Placed This Encounter   Medications    fluticasone (FLOVENT HFA) 110 MCG/ACT inhaler     Sig: Inhale 2 puffs into the lungs 2 times daily     Dispense:  1 each     Refill:  3        Return for Medicare Annual Wellness Visit in 1 year. Patient Instructions   Personalized Preventive Plan for Elena Oswald - 8/29/2022  Medicare offers a range of preventive health benefits. Some of the tests and screenings are paid in full while other may be subject to a deductible, co-insurance, and/or copay. Some of these benefits include a comprehensive review of your medical history including lifestyle, illnesses that may run in your family, and various assessments and screenings as appropriate. After reviewing your medical record and screening and assessments performed today your provider may have ordered immunizations, labs, imaging, and/or referrals for you. A list of these orders (if applicable) as well as your Preventive Care list are included within your After Visit Summary for your review. Other Preventive Recommendations:    A preventive eye exam performed by an eye specialist is recommended every 1-2 years to screen for glaucoma; cataracts, macular degeneration, and other eye disorders. A preventive dental visit is recommended every 6 months. Try to get at least 150 minutes of exercise per week or 10,000 steps per day on a pedometer . Order or download the FREE \"Exercise & Physical Activity: Your Everyday Guide\" from [employer]. Call 3-195.151.7937 or search [employer] online. You need 0551-9124 mg of calcium and 8223-7779 IU of vitamin D per day.  It is possible to meet your calcium requirement with diet alone, but a vitamin D supplement is usually necessary to meet this goal.  When exposed to the sun, use a sunscreen that protects against both UVA and UVB radiation with an SPF of 30 or greater. Reapply every 2 to 3 hours or after sweating, drying off with a towel, or swimming. Always wear a seat belt when traveling in a car. Always wear a helmet when riding a bicycle or motorcycle. Medicare Annual Wellness Visit    Madeleine Washington is here for Medicare AWV ( Patient is here for medicare wellness today . ) and Tingling ( Patient is having tingling on left hand )    Assessment & Plan   Welcome to Medicare preventive visit  Acquired hypothyroidism  -     TSH; Future  Hair loss  Moderate persistent asthma with allergic rhinitis with status asthmaticus  Dysuria  -     POCT Urinalysis no Micro  SSS (sick sinus syndrome) (HCC)  -     Comprehensive Metabolic Panel; Future  Atrial flutter, unspecified type ([address]  -     Comprehensive Metabolic Panel; Future  -     CBC with Auto Differential; Future  Left arm numbness  -     EMG; Future    Recommendations for Preventive Services Due: see orders and patient instructions/AVS.  Recommended screening schedule for the next 5-10 years is provided to the patient in written form: see Patient Instructions/AVS.     Return for Medicare Annual Wellness Visit in 1 year. Subjective   The following acute and/or chronic problems were also addressed today:  Hair loss left UE numbness  See above note     Patient's complete Health Risk Assessment and screening values have been reviewed and are found in Flowsheets. The following problems were reviewed today and where indicated follow up appointments were made and/or referrals ordered.     Positive Risk Factor Screenings with Interventions:             General Health and ACP:  General  In general, how would you say your health is?: Fair  In the past 7 days, have you experienced any of the following: New or Increased Pain, New or Increased Fatigue, Loneliness, Social Isolation, Stress or Anger?: No  Do you get the social and emotional support that you need?: (!) No  Do you have a Living Will?: Yes    Advance Directives       Power of [facility] ACP-Advance Directive ACP-Power of     Not on File Not on File Not on File Not on File          General Health Risk Interventions:  NO problems   at home on the computer     Health Habits/Nutrition:  Physical Activity: Inactive    Days of Exercise per Week: 0 days    Minutes of Exercise per Session: 0 min     Have you lost any weight without trying in the past 3 months?: (!) Yes  Body mass index: (!) 29.86  Have you seen the dentist within the past year?: (!) No  Health Habits/Nutrition Interventions:  Dental exam overdue:  patient encouraged to make appointment with his/her dentist    Hearing/Vision:  Do you or your family notice any trouble with your hearing that hasn't been managed with hearing aids?: No  Do you have difficulty driving, watching TV, or doing any of your daily activities because of your eyesight?: (!) Yes  Have you had an eye exam within the past year?: (!) No  No results found. Hearing/Vision Interventions:  Vision concerns:  patient encouraged to make appointment with his/her eye specialist    Safety:  Do you have working smoke detectors?: Yes  Do you have any tripping hazards - loose or unsecured carpets or rugs?: No  Do you have any tripping hazards - clutter in doorways, halls, or stairs?: No  Do you have either shower bars, grab bars, non-slip mats or non-slip surfaces in your shower or bathtub?: (!) No  Do all of your stairways have a railing or banister?: Yes  Do you always fasten your seatbelt when you are in a car?: Yes  Safety Interventions:  Home safety tips provided           Objective   Vitals:    08/29/22 1340   BP: 126/78   Site: Right Upper Arm   Position: Sitting   Cuff Size: Medium Adult   Pulse: 89   Temp: 98 °F (36.7 °C)   TempSrc: Oral   SpO2: 98%   Weight: 138 lb (62.6 kg)   Height: 4' 9\" (1.448 m)      Body mass index is 29.86 kg/m².       See above        Allergies   Allergen Reactions Ciprofloxacin Nausea And Vomiting     Prior to Visit Medications    Medication Sig Taking? Authorizing Provider   fluticasone (FLOVENT HFA) 110 MCG/ACT inhaler Inhale 2 puffs into the lungs 2 times daily Yes Flora Bernstein MD   levothyroxine (SYNTHROID) 50 MCG tablet TAKE ONE TABLET BY MOUTH DAILY Yes Floar Bernstein MD   nystatin (MYCOSTATIN) 189497 units TABS TAKE ONE TABLET BY MOUTH TWICE A DAY Yes Flora Bernstein MD   montelukast (SINGULAIR) 10 MG tablet TAKE ONE TABLET BY MOUTH ONCE NIGHTLY Yes Flora Bernstein MD   albuterol sulfate  (90 Base) MCG/ACT inhaler Inhale 2 puffs into the lungs every 6 hours as needed for Wheezing or Shortness of Breath Yes Flora Bernstein MD   rivaroxaban (XARELTO) 20 MG TABS tablet Take 20 mg by mouth Yes Historical Provider, MD   mirabegron (MYRBETRIQ) 50 MG TB24 Take 50 mg by mouth daily Yes Historical Provider, MD   fluticasone (FLONASE) 50 MCG/ACT nasal spray 1 spray by Nasal route daily Yes Meron Irving, DO   Multiple Vitamins-Minerals (THERAPEUTIC MULTIVITAMIN-MINERALS) tablet Take 1 tablet by mouth daily. Yes Historical Provider, MD   Calcium Carbonate-Vitamin D (CALCIUM 600+D3 PO) Take 1 tablet by mouth 2 times daily  Yes Historical Provider, MD   Biotin 2500 MCG CAPS Take  by mouth 2 times daily.  Yes Historical Provider, MD Michele-Hyosc-Meth Blue-Na Phos (ME/NAPHOS/MB/HYO1) 81.6 MG TABS TAKE ONE TABLET BY MOUTH EVERY MORNING AND TAKE ONE TABLET BY MOUTH EVERY NIGHT AT BEDTIME  Patient not taking: Reported on 8/29/2022  Flora Bernstein MD       [facility] (Including outside providers/suppliers regularly involved in providing care):   Patient Care Team:  Flora Bernstein MD as PCP - General (Internal Medicine)  Flora Bernstein MD as PCP - [facility]  Epi Reyna     Reviewed and updated this visit:  Tobacco  Allergies  Meds  Med Hx  Surg Hx  Soc Hx  Fam Hx

## 2022-08-29 NOTE — PATIENT INSTRUCTIONS
Personalized Preventive Plan for Ricardo Nava - 8/29/2022  Medicare offers a range of preventive health benefits. Some of the tests and screenings are paid in full while other may be subject to a deductible, co-insurance, and/or copay. Some of these benefits include a comprehensive review of your medical history including lifestyle, illnesses that may run in your family, and various assessments and screenings as appropriate. After reviewing your medical record and screening and assessments performed today your provider may have ordered immunizations, labs, imaging, and/or referrals for you. A list of these orders (if applicable) as well as your Preventive Care list are included within your After Visit Summary for your review. Other Preventive Recommendations:    A preventive eye exam performed by an eye specialist is recommended every 1-2 years to screen for glaucoma; cataracts, macular degeneration, and other eye disorders. A preventive dental visit is recommended every 6 months. Try to get at least 150 minutes of exercise per week or 10,000 steps per day on a pedometer . Order or download the FREE \"Exercise & Physical Activity: Your Everyday Guide\" from [employer]. Call 0-857.222.4659 or search [employer] online. You need 4205-0077 mg of calcium and 5150-3508 IU of vitamin D per day. It is possible to meet your calcium requirement with diet alone, but a vitamin D supplement is usually necessary to meet this goal.  When exposed to the sun, use a sunscreen that protects against both UVA and UVB radiation with an SPF of 30 or greater. Reapply every 2 to 3 hours or after sweating, drying off with a towel, or swimming. Always wear a seat belt when traveling in a car. Always wear a helmet when riding a bicycle or motorcycle.

## 2022-08-30 ENCOUNTER — TELEPHONE (OUTPATIENT)
Dept: FAMILY MEDICINE CLINIC | Age: 67
End: 2022-08-30

## 2022-08-30 DIAGNOSIS — E03.9 ACQUIRED HYPOTHYROIDISM: ICD-10-CM

## 2022-08-30 RX ORDER — SPIRONOLACTONE 50 MG/1
50 TABLET, FILM COATED ORAL DAILY
Qty: 30 TABLET | Refills: 5 | Status: SHIPPED | OUTPATIENT
Start: 2022-08-30

## 2022-08-30 RX ORDER — LEVOTHYROXINE SODIUM 0.05 MG/1
TABLET ORAL
Qty: 90 TABLET | Refills: 1 | Status: SHIPPED | OUTPATIENT
Start: 2022-08-30

## 2022-08-30 NOTE — TELEPHONE ENCOUNTER
Pt was looking over her paperwork from her visit yesterday and it shows that she is not due for her colon check til 4/2027 and pt stated that she has to get her colon checked every 5 years b/c she has issues and has polyps. Pt stated that she called where she went 5 years ago and they are no longer there.     Pl advise [phone] (home) [phone] (work)

## 2022-08-30 NOTE — TELEPHONE ENCOUNTER
Normal kidney and liver function. Thyroid is at correct level. Blood count. Question if she like to try a water pill for hair loss Aldactone 50 mg daily. Renal panel months on this. If not improving referral to dermatology  Patient wants to try water pill and patient also need refill on thyroid medication .      Jose Doe

## 2022-08-30 NOTE — TELEPHONE ENCOUNTER
Spoke with the pt, let her know that her last colonoscopy with Dr Wilma Skelton was 4/26/17 and no polyps were seen, consider repeat in 7-10 years. The reason our records show she is not due until 4/2027 was because the years were not changed to reflect when she would be due back for her next colonoscopy, so it automatically calculates it to 10 years which is recommendation for a normal screening colonoscopy. Pt will be following Dr Brad Brooks.

## 2022-09-14 ENCOUNTER — PROCEDURE VISIT (OUTPATIENT)
Dept: NEUROLOGY | Age: 67
End: 2022-09-14
Payer: MEDICARE

## 2022-09-14 DIAGNOSIS — R20.0 LEFT ARM NUMBNESS: ICD-10-CM

## 2022-09-14 PROCEDURE — 95886 MUSC TEST DONE W/N TEST COMP: CPT | Performed by: PSYCHIATRY & NEUROLOGY

## 2022-09-14 PROCEDURE — 95909 NRV CNDJ TST 5-6 STUDIES: CPT | Performed by: PSYCHIATRY & NEUROLOGY

## 2022-09-14 NOTE — PROGRESS NOTES
Hilda Smith M.D. [facility]) Physicians/[facility]  Board Certified in [address], [address] [place] [address]    EMG / NERVE CONDUCTION STUDY      PATIENT:  Princess Hickey       DATE OF EM22     YOB: 1955       REASON FOR EMG:   Left arm numbness      REFERRING PHYSICIAN:  Federico Gerardo MD  [address] [place] [address] [place] [address],  [facility]     SUMMARY:   The left median motor nerve study was normal.  The left median sensory nerve study had a slightly prolonged distal latency. The left ulnar motor nerve study had a slowing of conduction velocity across the elbow. The left ulnar and radial sensory nerve studies were normal.  Needle EMG of several muscles in the left upper extremity was normal.  Needle EMG of the left cervical paraspinal muscles was normal.    CLINICAL DIAGNOSIS:  Unspecified neuropathy        EMG RESULTS:     1. This patient has a mild to moderate left ulnar nerve lesion at the elbow. 2.  This patient also has a mild left median nerve lesion at the wrist.  (Carpal tunnel syndrome.        ---------------------------------------------  Hilda Smith M.D.   Electromyographer / Neurologist

## 2022-09-14 NOTE — PATIENT INSTRUCTIONS
Verbal consent was obtained from patient and/or patient's advocate for in office procedure with Dr. Molly Serrano (EMG or EEG).

## 2022-09-17 ENCOUNTER — TELEPHONE (OUTPATIENT)
Dept: FAMILY MEDICINE CLINIC | Age: 67
End: 2022-09-17

## 2022-09-17 DIAGNOSIS — G56.22 ULNAR NERVE COMPRESSION, LEFT: Primary | ICD-10-CM

## 2022-09-17 DIAGNOSIS — G56.02 CARPAL TUNNEL SYNDROME ON LEFT: ICD-10-CM

## 2022-09-17 NOTE — TELEPHONE ENCOUNTER
Emg / NC studies  with mild to moderate left ulnar nerve compression and mild left cts   If symptoms  she should follow up with her hand surgeon

## 2022-11-22 ENCOUNTER — COMMUNITY OUTREACH (OUTPATIENT)
Dept: FAMILY MEDICINE CLINIC | Age: 67
End: 2022-11-22

## 2022-11-22 NOTE — PROGRESS NOTES
Patient's HM shows they are overdue for [place] [address] and  files searched. No results to attach to order nor HM updated.

## 2022-11-25 DIAGNOSIS — E03.9 ACQUIRED HYPOTHYROIDISM: ICD-10-CM

## 2022-11-28 RX ORDER — LEVOTHYROXINE SODIUM 0.05 MG/1
TABLET ORAL
Qty: 90 TABLET | Refills: 1 | Status: SHIPPED | OUTPATIENT
Start: 2022-11-28

## 2022-12-12 ENCOUNTER — OFFICE VISIT (OUTPATIENT)
Dept: ORTHOPEDIC SURGERY | Age: 67
End: 2022-12-12

## 2022-12-12 VITALS — BODY MASS INDEX: 29.77 KG/M2 | HEIGHT: 57 IN | RESPIRATION RATE: 16 BRPM | WEIGHT: 138 LBS

## 2022-12-12 DIAGNOSIS — G56.22 CUBITAL TUNNEL SYNDROME ON LEFT: ICD-10-CM

## 2022-12-12 DIAGNOSIS — G56.02 CARPAL TUNNEL SYNDROME OF LEFT WRIST: Primary | ICD-10-CM

## 2022-12-12 NOTE — PROGRESS NOTES
Ms. Janis Moreira is a 78 y.o. right handed woman  who is seen today in Hand Surgical Consultation at the request of Monica Wilson MD.    She presents today regarding Left symptoms which have been present for approximately 6 months. A history of antecedent trauma or injury is Absent. She reports symptoms to include mild numbness & tingling in the Whole Hand, worst in the thumb and index finger. Neurologic symptoms do Frequently awaken her from sleep. She reports mild pain located in the Medial left elbow, with retrograde and antegrade radiation . She reports no pain located in the palmar left wrist.  Symptoms are worsening over time. Previous treatment has included conservative measures, activity modification, avoidance of bothersome tasks, and OTC medication. She does not claim relation of her symptoms to her required work activities. She has undergone electrodiagnostic testing. I have today reviewed with Janis Moreira the clinically relevant, past medical history, medications, allergies,  family history, social history, and Review Of Systems & I have documented any details relevant to today's presenting complaints in my history above. Ms. Sb Crawford's self-reported past medical history, medications, allergies,  family history, social history, and Review Of Systems have been scanned into the chart under the \"Media\" tab. Physical Exam:  Ms. Sb Crawford's most recent vitals:  Vitals  Resp: 16  Height: 4' 9\" (144.8 cm)  Weight: 138 lb (62.6 kg)    She is well nourished, oriented to person, place & time. She demonstrates appropriate mood and affect as well as normal gait and station.     Skin: Normal in appearance, Normal Color, and Free of Lesions Bilaterally   Digital range of motion is equal bilaterally   Wrist range of motion is equal bilaterally   Elbow range of motion is equal bilateral   Sensation is subjectively tingling in the Thumb and Index Finger and objectively present in the same distribution bilaterally. All other digits show normal sensation  Vascular examination reveals good capillary refill and good color bilaterally  Swelling is absent about the Medial elbow on the Left, normal on the Right & is absent about the Volar wrist on the Left, normal on the Right  There is no evidence of gross joint instability bilaterally. Muscular strength is clinically appropriate bilaterally. Examination for Carpal Tunnel Syndrome shows Carpal Tunnel Compression Test to be Negative on the right & Moderately Positive on the left. The patient displays moderate baseline symptoms to potentially confound the exam.  The thenar musculature is not atrophied & weakened. Examination for Cubital Tunnel Syndrome on the left shows mild tenderness to palpation at the Medial epicondyle. The Ulnar Nerve rests behind the medial epicondyle without subluxation upon elbow flexion. Elbow flexion-compression test is Negative, and there is an active Tinnel's Sign over the Cubital Tunnel. The Ulnar Nerve innervated intrinsic musculature is not atrophied & weakened. Review of Electrodiagnostic Testing:  Electrodiagnostic Studies performed by another Physician outside of my practice were Personally Reviewed & Interpreted by myself today. Test performed on: 9/14/22    NERVE CONDUCTION STUDY:  LEFT  Median Nerve: Sensory Latency: 3.65  Motor Latency: 3.39  Ulnar Nerve:  Conduction Velocity:  42.2    EMG:  LEFT  Normal    My Interpretation: This study is consistent with: Mild LEFT Median Nerve Entrapment at the Carpal Tunnel and Moderate LEFT  Ulnar Nerve Entrapment at the Cubital Tunnel    Impression:  Ms. Jenniffer Nieves has developed carpal tunnel syndrome and Ulnar Nerve entrapment at the Cubital Tunnel, which is currently exacerbated, and presents requesting further treatment. Plan:  I have had a thorough discussion with Ms. Jenniffer Nieves regarding the treatment options available for her initially presenting carpal tunnel syndrome, which is causing her significant  limitations. I have outlined for Ms. Oscar Hairston the benefits and consequences of the various treatment modalities, including the fact that surgical treatment is the only modality which is reasonably expected to provide long lasting or permanent resolution of her symptoms. Based upon our current discussion and a reasonable understating of the options available to her, Ms. Oscar Hairston has selected to proceed with surgical Carpal Tunnel Release. I have discussed the details of the surgical procedure, the pre, navjot and postoperative concerns and the appropriate expectations after surgery with Ms. Oscar Hairston today. She was given the opportunity to ask questions, voiced an understanding of the procedure, and she did wish to proceed with Left Carpal Tunnel Release. I had an extensive discussion with Ms. Oscar Hairston (and any family members present with her today) regarding the natural history, etiology, and long term consequences of this problem. We have mutually selected a surgical treatment plan  and, in my opinion, surgical intervention is indicated at this time. I have discussed with her the potential complications, limitations, expectations, alternatives, and risks of Carpal Tunnel Release. She has had full opportunity to ask her questions. I have answered them all to her satisfaction. I feel that Ms. Oscar Hairston (and any family members present with her today) do understand our discussion today and she has provided informed consent for Left Carpal Tunnel Release. I have had a thorough discussion with Ms. Oscar Hairston regarding the treatment options available for her initially presenting cubital tunnel syndrome, which is causing her significant  limitations. I have outlined for Ms. Oscar Hairston the benefits and consequences of the various treatment modalities, including the fact that surgical treatment is the only modality which is reasonably expected to provide long lasting or permanent resolution of her symptoms. Based upon our current discussion and a reasonable understating of the options available to her, Ms. Marston Cranker has selected to proceed with surgical Ulnar Nerve decompression at the Cubital Tunnel. I have discussed the details of the surgical procedure, the pre, navjot and postoperative concerns and the appropriate expectations after surgery with Ms. Marston Cranker today. She was given the opportunity to ask questions, voiced an understanding of the procedure, and she did wish to proceed with Left Ulnar Nerve decompression at the Cubital Tunnel. I had an extensive discussion with Ms. Marston Cranker (and any family members present with her today) regarding the natural history, etiology, and long term consequences of this problem. We have mutually selected a surgical treatment plan  and, in my opinion, surgical intervention is indicated at this time. I have discussed with her the potential complications, limitations, expectations, alternatives, and risks of Ulnar Nerve decompression at the Cubital Tunnel. She has had full opportunity to ask her questions. I have answered them all to her satisfaction. I feel that Ms. Marston Cranker (and any family members present with her today) do understand our discussion today and she has provided informed consent for Left Ulnar Nerve decompression at the Cubital Tunnel. I have explained to Ms. Marston Cranker that despite successful treatment (surgical or otherwise) of her current presenting condition, that due to her coexistent conditions (both diagnosed and undiagnosed), that she is likely to have some permanent residual symptoms related to these conditions that do not improve long term. I have also explained that maximal recovery of function & symptom improvement may take a full year or longer to realize. She voiced a clear understanding of this.      I have also discussed with Ms. Jessa Foss the other treatment options available to her for this condition. We have today selected to proceed with Surgical treatment. She has voiced and  understanding that there are other less aggressive treatment options which are available in this situation, albeit possibly less efficacious or durable, and she is comfortable with the plan that she has chosen. Ms. Jessa Foss has been given a full verbal list of instructions and precautions related to her present condition. I have asked her to followup with me in the office at the prescribed time. She is also specifically requested to call or return to the office sooner if her symptoms change or worsen prior to the next scheduled appointment.

## 2022-12-12 NOTE — LETTER
[facility] SURGERY  Surgery Scheduling Form:  DEMOGRAPHICS:                                                                                                              .    Patient Name:  Libby Kruse  Patient :  1955   Patient SS#:  [id number]    Patient Phone:  [phone] (home) [phone] (work) Alt. Patient Phone:    Patient Address:  [address]  [address] [place] [address]    PCP:  Shirlean Duverney, MD  Insurance:   Payer/Plan Subscr  Sex Relation Sub. Ins. ID Effective Group Num   1. [address] 1955 Female Self [id number] [id number]                                   [address]             SAME INSURANCE    DIAGNOSIS & PROCEDURE:                                                                                            .    Diagnosis:   left Cubital Tunnel Syndrome / Ulnar Nerve Entrapment at Elbow and left Carpal Tunnel Syndrome G56.22, G56.02  Operation:  left  Ulnar Nerve Decompression  (at  Elbow) and  left Carpal Tunnel Release    [CPT: 74753 and 44474]  Location:  [facility]  Surgeon:  Deanna Nielson    SCHEDULING INFORMATION:                                                                                         .    Surgeon's Scheduling Instruction:  elective    RN Post-op Appt:  [x] Yes   [] No  Preferred Thursday:   [] Yes   [] No    Requested Date:  23 OR Time: 8:00am  Patient Arrival Time:    OR Time Required:  20  Minutes  Anesthesia:  General  Equipment:  None  Mini C-Arm:  No   Standard C-Arm:  No  Status:  outpatient  PAT Required:  Yes  Comments:                      Deandre FavorsJose Littlejohn MD  22 1:24 PM    BILLING INFORMATION:                                                                                                    .    Procedure:       CPT Code Modifier  left Ulnar Nerve Decompression  (at  Elbow) and  left Carpal Tunnel Release     .                  Pre Operative Physician Prophylaxis Orders - SCIP Protocols      Pre-Operative Antibiotic Order:    Allergies   Allergen Reactions    Ciprofloxacin Nausea And Vomiting          [x]  ----  No Antibiotic Ordered       []  ----  Give the following Antibiotic within 1 hour prior to start time:         Ancef 1 gram IV if patient is less than 200 pounds    or       Ancef 2 grams IV if patient is greater than 200 pounds    or      Vancomycin 1 gram IV (over 1 hour) if patient is allergic to           PENICILLINS or CEFALOSPORINS    Procedure: left Ulnar Nerve Decompression (at  Elbow) and left Carpal Tunnel Release      Patient: Jhonny Haas  :    1955    Physician Signature:     Date: 22  Time: 1:24 PM

## 2022-12-12 NOTE — LETTER
CONSENT TO OPERATION  AND/OR OTHER PROCEDURE(S)          PATIENT : Stanislav Lamas OF BIRTH:  1955      DATE : 12/12/22          1. I request and consent that Dr. Danelle Jean. Dieudonne Cabrera,  and/or his associates or assistants perform an operation and/or procedures on the above patient at  [place] [address], to treat the condition(s) which appear indicated by the diagnostic studies already performed. I have been told that in general terms the nature, purpose and reasonable expectations of the operation and/or procedure(s) are:       left Ulnar Nerve Decompression (at  Elbow) & left Carpal Tunnel Release      2. It has been explained to me by the informing physician that during the course of the operation and/or procedure(s) unforeseen conditions may be revealed that necessitate an extension of the original operation and/or procedure(s) or different operation and/or procedures than those set forth in Paragraph 1. I therefore authorize and request that my physician and/or his associates or assistants perform such operations and/or procedures as are necessary and desirable in the exercise of professional judgment. The authority granted under this Paragraph 2 shall extend to all conditions that require treatment and are known to my physician at the time the operation is commenced. 3. I have been made aware by the informing physician of certain risks and consequences that are associated with the operation and/or procedure(s) described in Paragraph 1, the reasonable alternative methods or treatment, the possible consequences, the possibility that the operation and/or procedure(s) may be unsuccessful and the possibility of complications.   I understand the reasonably known risks to be:      - Bleeding  - Infection  - Poor Healing  - Possible Damage to Nerve, Vessel, Tendon/Muscle or Bone  - Need for further Treatment/Surgery  - Stiffness  - Pain  - Residual or Recurrent Symptoms  - Anesthetic and/or Medical Risks  - We have discussed the specific limitations and risks of hospital and/or office based treatment at this time due to the COVID-19 pandemic                I have been counseled about the risks of cameron Covid-19 in the navjot-operative and post-operative periods related to this procedure. I have been made aware that cameron Covid-19 around the time of a surgical procedure may worsen my prognosis for recovering from the virus and lend to a higher morbidity and or mortality risk. With this knowledge, I have requested to proceed with the procedure as scheduled. 4. I have also been informed by the informing physician that there are other risks from both known and unknown causes that are attendant to the performance of any surgical procedure. I am aware that the practice of medicine and surgery is not an exact science, and that no guarantees have been made to me concerning the results of the operation and/or procedure(s). 5. I   CONSENT / REFUSE CONSENT  (strike the phrase that does not apply) to the taking of photographs before, during and/or after the operation or procedure for scientific/educational purposes. 6. I consent to the administration of anesthesia and to the use of such anesthetics as may be deemed advisable by the anesthesiologist who has been engaged by me or my physician.     7. I certify that I have read and understand the above consent to operation and/or other procedure(s); that the explanations therein referred to were made to me by the informing physician in advance of my signing this consent; that all blanks or statements requiring insertion or completion were filled in and inapplicable paragraphs, if any, were stricken before I signed; and that all questions asked by me about the operation and/or procedure(s) which I have consented to have been fully answered in a satisfactory manner.                                 _______________________ 12/12/22                              Witness     Signature Of Patient         Date        Henry Monsalve                                                 Informing Physician                                           Signature of Informing Physician                              If patient is unable to sign or is a minor, complete one of the following:    (A)  Patient is a minor   years of age. (B)  Patient is unable to sign because: The undersigned represents that he or she is duly authorized to execute this consent for and on behalf of the above named patient. Witness               o  Parent  o  Guardian   o  Spouse       o  Other (specify)                                           Patient Name: Meseret Parsons  Patient YOB: 1955  Dr. Janel Jay' Return To Work Policy  Regarding your ability to return to work after surgery or injury, Dr. Janel Jay will not state that any patient is off of work or cannot work at all. He will place you on restrictions after your surgical procedure or injury. Depending on the details of your particular situation, Dr. Janel Jay may state that you will have either light use or no use of your hand for a specific number of weeks. It is your obligation to communicate with your employer regarding your restrictions. It is your employer's decision as to whether they will accommodate your restrictions (i.e. allow you to come to work in your restricted capacity) or to not allow you to return to work under your restrictions. Dr. Janel Jay does not participate in making this decision and cannot influence your employer regarding their decision. If you do not communicate your restrictions to your employer, or if you do not present to work as you are scheduled to, Dr. Janel Jay will not provide an 'excuse' to explain your absence.   A doctors note, or official forms (BWC, FMLA, etc.) will be filled out, upon request, to indicate your date of surgery and your restrictions as stated above. Dr. Opal Bagley' Narcotic Policy  Patients will only be prescribed narcotics after surgical procedures or significant injury. Not all procedures cause pain great enough to require Narcotics and thus, not all patients will receive prescriptions after surgical procedures or injuries. Narcotics are never prescribed for chronic conditions. Narcotics are never prescribed for use longer than one week at a time. Refills are only granted in unusual circumstances and only at Dr. Miranda Guerrero discretion. Patients who are receiving narcotic medication from another physician or who are under pain management contracts will not be given a prescription for narcotics for any reason. Surgery Arrival Time:  You have been advised of the START TIME for your surgery as well as the ARRIVAL TIME at which you need to arrive at the surgery facility. Please understand that there is a certain amount of preparation which takes place at the surgery facility prior to the start of your surgery. If you arrive later than your scheduled ARRIVAL TIME, it may be necessary to cancel your surgery for that day and reschedule your procedure due to lack of adequate time for pre-surgery preparations. Thank you for being on time for your arrival.    I have read the above policies and understand that by agreeing to proceed with treatment by Dr. Hugh Colon and his team, that I am agreeing to abide by these policies.   Patient Name:  Rosalinda Vance    Patient Signature:  _____________________________    Cheral Crigler Date:   12/12/22

## 2022-12-12 NOTE — Clinical Note
Dear  Raphael Powers MD,  Thank you very much for your referral or Ms. Jane Cabrera to me for evaluation and treatment of her Hand & Wrist condition. I appreciate your confidence in me and thank you for allowing me the opportunity to care for your patients. If I can be of any further assistance to you on this or any other patient, please do not hesitate to contact me. Sincerely,  Nain Juárez.  Alfredo Patino MD

## 2022-12-12 NOTE — PATIENT INSTRUCTIONS
Pre-Operative Instructions    1. The night before your surgery, unless otherwise instructed, do not eat any food, drink any liquids, chew gum or mints after midnight. Abstain from alcohol for 24 hours prior to surgery. 2. You will be contacted by the Hospital the working day prior to your procedure to confirm your arrival time. 3. Patients under 25years of age must have a parent or legal guardian present to sign their consent and discharge paperwork. 4. On the day of surgery,  you will be seen pre-operatively by an anesthesiologist.     5. If you are having hand surgery, it is recommended that nail polish and acrylic nails be removed prior to surgery if possible. 6. Please bring cases for glasses, contact lenses, hearing aids or dentures. They will likely be removed prior to surgery. 7. Wear casual, loose-fitting and comfortable clothing. Consider that you may have a large dressing to fit under your clothing after surgery. 9. Please do not bring valuables such as jewelry or large sums of cash to the hospital. Remove all body piercings before coming to the hospital. Earlene Cueto may not  wear any rings on the hand if you are having surgery on that hand, wrist or elbow. 10. Do not smoke or chew tobacco before your surgery. [address] [place] [address] and surgery facilities are smoke-free environments. Smoking is not permitted anywhere on campus. 11. Be sure to follow any additional instructions from your physician. If the above conditions are not met, your surgery may be cancelled and rescheduled for another day. Should you develop any change in your health such as fever, cough, sore throat, cold, flu, or infection, or if you have any questions regarding your Pre-admission or surgery, please contact [address] - Surgery Scheduling at [phone], Monday through Friday, 9 a.m. to 5 p.m.

## 2022-12-14 ENCOUNTER — TELEPHONE (OUTPATIENT)
Dept: ORTHOPEDIC SURGERY | Age: 67
End: 2022-12-14

## 2022-12-15 ENCOUNTER — TELEPHONE (OUTPATIENT)
Dept: FAMILY MEDICINE CLINIC | Age: 67
End: 2022-12-15

## 2022-12-15 NOTE — TELEPHONE ENCOUNTER
----- Message from Willa Koenig sent at 12/15/2022 12:56 PM EST -----  Subject: Appointment Request    Reason for Call: Established Patient Appointment needed: Routine Pre-Op    QUESTIONS    Reason for appointment request? No appointments available during search     Additional Information for Provider? Patient has hand surgery with Dr. Roselia Kessler at [address] [facility] on 1/12/23. She needs a pre-op physical appointment before   her surgery date but there are none available on my end.  Please give her a   call to set up a pre-op visit and schedule her pre-op EKG.   ---------------------------------------------------------------------------  --------------  Nita Cruz INFO  [phone]; OK to leave message on voicemail  ---------------------------------------------------------------------------  --------------  SCRIPT ANSWERS  COVID Screen: Mo Colvin

## 2022-12-29 ENCOUNTER — TELEPHONE (OUTPATIENT)
Dept: ORTHOPEDIC SURGERY | Age: 67
End: 2022-12-29

## 2022-12-29 NOTE — TELEPHONE ENCOUNTER
CPT: [id number], [id number]  BODY PART: left arm  STATUS: outpatient  LOCATION: [place]  AUTHORIZATION: NPR    Per [facility] website, [address]

## 2023-01-05 ENCOUNTER — OFFICE VISIT (OUTPATIENT)
Dept: FAMILY MEDICINE CLINIC | Age: 68
End: 2023-01-05
Payer: MEDICARE

## 2023-01-05 VITALS
HEIGHT: 57 IN | DIASTOLIC BLOOD PRESSURE: 80 MMHG | HEART RATE: 87 BPM | BODY MASS INDEX: 30.2 KG/M2 | SYSTOLIC BLOOD PRESSURE: 123 MMHG | WEIGHT: 140 LBS | TEMPERATURE: 98.2 F | OXYGEN SATURATION: 98 %

## 2023-01-05 DIAGNOSIS — E03.9 ACQUIRED HYPOTHYROIDISM: ICD-10-CM

## 2023-01-05 DIAGNOSIS — Z12.31 ENCOUNTER FOR SCREENING MAMMOGRAM FOR MALIGNANT NEOPLASM OF BREAST: ICD-10-CM

## 2023-01-05 DIAGNOSIS — J45.32: ICD-10-CM

## 2023-01-05 DIAGNOSIS — I49.5 SSS (SICK SINUS SYNDROME) (HCC): ICD-10-CM

## 2023-01-05 DIAGNOSIS — G56.02 CARPAL TUNNEL SYNDROME ON LEFT: ICD-10-CM

## 2023-01-05 DIAGNOSIS — I48.92 ATRIAL FLUTTER, UNSPECIFIED TYPE (HCC): ICD-10-CM

## 2023-01-05 DIAGNOSIS — G56.22 ULNAR NERVE COMPRESSION, LEFT: ICD-10-CM

## 2023-01-05 DIAGNOSIS — Z01.818 PREOP EXAMINATION: Primary | ICD-10-CM

## 2023-01-05 PROCEDURE — 93000 ELECTROCARDIOGRAM COMPLETE: CPT | Performed by: INTERNAL MEDICINE

## 2023-01-05 PROCEDURE — 1123F ACP DISCUSS/DSCN MKR DOCD: CPT | Performed by: INTERNAL MEDICINE

## 2023-01-05 PROCEDURE — 99214 OFFICE O/P EST MOD 30 MIN: CPT | Performed by: INTERNAL MEDICINE

## 2023-01-05 RX ORDER — TIZANIDINE 2 MG/1
TABLET ORAL
Qty: 90 TABLET | Refills: 0 | Status: SHIPPED | OUTPATIENT
Start: 2023-01-05

## 2023-01-05 ASSESSMENT — PATIENT HEALTH QUESTIONNAIRE - PHQ9
SUM OF ALL RESPONSES TO PHQ QUESTIONS 1-9: 4
10. IF YOU CHECKED OFF ANY PROBLEMS, HOW DIFFICULT HAVE THESE PROBLEMS MADE IT FOR YOU TO DO YOUR WORK, TAKE CARE OF THINGS AT HOME, OR GET ALONG WITH OTHER PEOPLE: 0
SUM OF ALL RESPONSES TO PHQ QUESTIONS 1-9: 4
9. THOUGHTS THAT YOU WOULD BE BETTER OFF DEAD, OR OF HURTING YOURSELF: 0
3. TROUBLE FALLING OR STAYING ASLEEP: 2
SUM OF ALL RESPONSES TO PHQ QUESTIONS 1-9: 4
SUM OF ALL RESPONSES TO PHQ QUESTIONS 1-9: 4
2. FEELING DOWN, DEPRESSED OR HOPELESS: 0
SUM OF ALL RESPONSES TO PHQ9 QUESTIONS 1 & 2: 0
1. LITTLE INTEREST OR PLEASURE IN DOING THINGS: 0
7. TROUBLE CONCENTRATING ON THINGS, SUCH AS READING THE NEWSPAPER OR WATCHING TELEVISION: 0
4. FEELING TIRED OR HAVING LITTLE ENERGY: 2
8. MOVING OR SPEAKING SO SLOWLY THAT OTHER PEOPLE COULD HAVE NOTICED. OR THE OPPOSITE, BEING SO FIGETY OR RESTLESS THAT YOU HAVE BEEN MOVING AROUND A LOT MORE THAN USUAL: 0
6. FEELING BAD ABOUT YOURSELF - OR THAT YOU ARE A FAILURE OR HAVE LET YOURSELF OR YOUR FAMILY DOWN: 0
5. POOR APPETITE OR OVEREATING: 0

## 2023-01-05 ASSESSMENT — ENCOUNTER SYMPTOMS
COUGH: 0
CONSTIPATION: 1
APNEA: 0
DIARRHEA: 0
BACK PAIN: 1
NAUSEA: 0
ABDOMINAL PAIN: 0
VOMITING: 0
SHORTNESS OF BREATH: 0
TROUBLE SWALLOWING: 0
BLOOD IN STOOL: 0

## 2023-01-05 NOTE — PROGRESS NOTES
SUBJECTIVE:  Madeleine Washington is a 78 y.o. female being evaluated for:    Chief Complaint   Patient presents with    Pre-op Exam      Patient is having surgery on 01/24/2023   of left hand  with DR Marcellus Berry  at [facility] .     Other      Patient is having dry thoart  .         HPI   Patient with left arm and hand numbness for 6 months Emg with left ulnar nerve and median nerve compression   Scheduled for surgical repair      Very dry throat  SInus congestion Post nasal  drip  NO fevers or chills  At times ears seem to slosh  Lots of sinus and allergy problems     Pain in groin and down into leg with walking  back and neck bothering her for years     NO palpitations or fast heart rates   Allergies   Allergen Reactions    Ciprofloxacin Nausea And Vomiting     Current Outpatient Medications   Medication Sig Dispense Refill    Polyethylene Glycol 3350 (MIRALAX PO) Take by mouth      tiZANidine (ZANAFLEX) 2 MG tablet One to two pills tid prn for leg pain 90 tablet 0    levothyroxine (SYNTHROID) 50 MCG tablet TAKE ONE TABLET BY MOUTH DAILY 90 tablet 1    fluticasone (FLOVENT HFA) 110 MCG/ACT inhaler Inhale 2 puffs into the lungs 2 times daily 1 each 3    albuterol sulfate HFA (PROVENTIL;VENTOLIN;PROAIR) 108 (90 Base) MCG/ACT inhaler Inhale 2 puffs into the lungs every 6 hours as needed for Wheezing or Shortness of Breath 1 each 5    montelukast (SINGULAIR) 10 MG tablet Take 1 tablet by mouth nightly 90 tablet 3    nystatin (MYCOSTATIN) 657090 units TABS TAKE ONE TABLET BY MOUTH TWICE A  tablet 3    Methen-Hyosc-Meth Blue-Na Phos (ME/NAPHOS/MB/HYO1) 81.6 MG TABS TAKE ONE TABLET BY MOUTH EVERY MORNING AND TAKE ONE TABLET BY MOUTH EVERY NIGHT AT BEDTIME 60 tablet 5    rivaroxaban (XARELTO) 20 MG TABS tablet Take 20 mg by mouth      mirabegron (MYRBETRIQ) 50 MG TB24 Take 50 mg by mouth daily      fluticasone (FLONASE) 50 MCG/ACT nasal spray 1 spray by Nasal route daily 1 Bottle 11    Multiple Vitamins-Minerals (THERAPEUTIC MULTIVITAMIN-MINERALS) tablet Take 1 tablet by mouth daily. Calcium Carbonate-Vitamin D (CALCIUM 600+D3 PO) Take 1 tablet by mouth 2 times daily       Biotin 2500 MCG CAPS Take  by mouth 2 times daily. No current facility-administered medications for this visit. Social History     Socioeconomic History    Marital status:      Spouse name: Not on file    Number of children: Not on file    Years of education: Not on file    Highest education level: Not on file   Occupational History    Not on file   Tobacco Use    Smoking status: Former     Packs/day: 0.50     Years: 8.00     Pack years: 4.00     Types: Cigarettes     Start date: 1972     Quit date: 1980     Years since quittin.0    Smokeless tobacco: Never   Vaping Use    Vaping Use: Never used   Substance and Sexual Activity    Alcohol use: Yes     Comment: rare, occ glass wine    Drug use: Never    Sexual activity: Not Currently     Partners: Male   Other Topics Concern    Not on file   Social History Narrative    Not on file     Social Determinants of Health     Financial Resource Strain: Low Risk     Difficulty of Paying Living Expenses: Not hard at all   Food Insecurity: No Food Insecurity    Worried About [facility] in the Last Year: Never true    [facility] in the Last Year: Never true   Transportation Needs: No Transportation Needs    Lack of Transportation (Medical): No    Lack of Transportation (Non-Medical):  No   Physical Activity: Inactive    Days of Exercise per Week: 0 days    Minutes of Exercise per Session: 0 min   Stress: Not on file   Social Connections: Not on file   Intimate Partner Violence: Not on file   Housing Stability: Not on file      Past Medical History:   Diagnosis Date    Allergic rhinitis     Asthma     Atrial flutter ([facility] [address]     Atrial septal defect     Constipation     Environmental allergies     H/O degenerative disc disease     Hypothyroidism     Pacemaker     Reflux SSS (sick sinus syndrome) [facility])      Past Surgical History:   Procedure Laterality Date    CARDIAC PACEMAKER PLACEMENT  2019    CARDIAC SURGERY      atrial septal defect     SECTION      x 3    COLONOSCOPY  2017    normal dr Susan Szymanski     COLONOSCOPY  3/15/12    WNL    CYSTOURETHROSCOPY/URETHRAL DILATION      HYSTERECTOMY (CERVIX STATUS UNKNOWN)  1985    complete AZEB BSO no cancer. for fibroid tumors    HYSTERECTOMY, TOTAL ABDOMINAL (CERVIX REMOVED)      with ooph for fibroids     JOINT REPLACEMENT Right     hip     PACEMAKER INSERTION      PAIN MANAGEMENT PROCEDURE Bilateral 10/22/2020    BILATERAL L2, L3, L4, L5 MEDIAL BRANCH BLOCK WITH FLUOROSCOPY (86283, 69064, 23814) performed by Bill Melton MD at [address] [facility] Bilateral 2020    BILATERAL L5 TRANSFORAMINAL EPIDURAL STEROID INJECTION WITH FLUOROSCOPY performed by Bill Melton MD at [address] [place] [address] N/A 2020    C7-T1 INTERLAMINAR EPIDURAL STEROID INJECTION WITH FLUOROSCOPY performed by Bill Melton MD at [place] [address]  2013    right side     WRIST GANGLION EXCISION      right     Family History   Problem Relation Age of Onset    Other Mother         COPD    Alzheimer's Disease Mother     Parkinsonism Mother     Cancer Father         lung    High Blood Pressure Father     Diabetes Father         diet controlled diabetes     Stroke Father     Other Sister         on thyroid medication     Diabetes Paternal Aunt     No Known Problems Maternal Grandmother     No Known Problems Maternal Grandfather     No Known Problems Paternal Grandmother     No Known Problems Paternal Grandfather     NO family hx of problems with anesthesia or bleeding clotting disorders   Review of Systems   Constitutional:  Negative for activity change, fatigue, fever and unexpected weight change.         No problems with anesthesia in patient or family  No known exposure to contagious or infectious diseases   HENT:  Positive for nosebleeds (off and on). Negative for congestion, dental problem (no loose teeth or caps in front) and trouble swallowing. Eyes:  Negative for visual disturbance. NO glaucoma    Respiratory:  Negative for apnea, cough and shortness of breath. No COPD or asthma or TB   Cardiovascular:  Negative for chest pain, palpitations and leg swelling. No  Hx  Of MI,or chf   SSS with pacer and atrial flutter  Repaired ASD   Gastrointestinal:  Positive for constipation (uses miralax). Negative for abdominal pain, blood in stool, diarrhea, nausea and vomiting. No hepatitis or jaundice,  No hx of ulcer disease   Endocrine: Negative for cold intolerance and heat intolerance. No hx diabetes   Thyroid on replacement    Genitourinary:  Negative for dysuria, hematuria and vaginal bleeding. No hx of kidney or bladder disease    Musculoskeletal:  Positive for back pain and neck pain. Negative for arthralgias and gait problem. Skin:  Negative for rash and wound. Neurological:  Negative for dizziness, tremors, seizures, syncope, speech difficulty, weakness, light-headedness, numbness and headaches. No hx of stroke or tia    Hematological:  Bruises/bleeds easily (bruises on xarelto   NO hx of bleeding with surgery). No hx of blood clots    Psychiatric/Behavioral:  Negative for dysphoric mood. OBJECTIVE:  /80 (Site: Left Upper Arm, Position: Sitting, Cuff Size: Medium Adult)   Pulse 87   Temp 98.2 °F (36.8 °C) (Oral)   Ht 4' 9\" (1.448 m)   Wt 140 lb (63.5 kg)   LMP  (LMP Unknown)   SpO2 98%   BMI 30.30 kg/m²      Body mass index is 30.3 kg/m². Physical Exam    ASSESSMENT/PLAN:    Shandra Lara was seen today for pre-op exam and other.     Diagnoses and all orders for this visit:    Preop examination cl;eared for surgery   -     EKG 12 Lead - Clinic Performed normal sinus rhythm with non specific t wave changes unchanged     Ulnar nerve compression, left    Carpal tunnel syndrome on left    Atrial flutter, unspecified type (HCC) in sinus on NOAC     SSS (sick sinus syndrome) (HCC) pacer and follows with Dr Pierre Richardson     Acquired hypothyroidism thyroid normal in august     Mild persistent asthma with allergic rhinitis NO problems     Left leg pain to try muscle relaxer if persisting more testing     Encounter for screening mammogram for malignant neoplasm of breast script for mammogram       Orders Placed This Encounter   Medications    tiZANidine (ZANAFLEX) 2 MG tablet     Sig: One to two pills tid prn for leg pain     Dispense:  90 tablet     Refill:  0          Return in about 8 months (around 9/1/2023), or if symptoms worsen or fail to improve, for medicare wellness . There are no Patient Instructions on file for this visit.

## 2023-01-12 RX ORDER — OMEPRAZOLE 20 MG/1
40 CAPSULE, DELAYED RELEASE ORAL DAILY
COMMUNITY

## 2023-01-12 NOTE — PROGRESS NOTES
[facility] time ___0630_________        Surgery time ___0800_________    Take the following medications with a sip of water: Follow your MD/Surgeons pre-procedure instructions regarding your medications     Do not eat or drink anything after 12:00 midnight prior to your surgery. This includes water chewing gum, mints and ice chips. You may brush your teeth and gargle the morning of your surgery, but do not swallow the water     Please see your family doctor/pediatrician for a history and physical and/or concerning medications. Bring any test results/reports from your physicians office. If you are under the care of a heart doctor or specialist doctor, please be aware that you may be asked to them for clearance    You may be asked to stop blood thinners such as Coumadin, Plavix, Fragmin, Lovenox, etc., or any anti-inflammatories such as:  Aspirin, Ibuprofen, Advil, Naproxen prior to your surgery. We also ask that you stop any OTC medications such as fish oil, vitamin E, glucosamine, garlic, Multivitamins, COQ 10, etc.    We ask that you do not smoke 24 hours prior to surgery  We ask that you do not  drink any alcoholic beverages 24 hours prior to surgery     You must make arrangements for a responsible adult to take you home after your surgery. For your safety you will not be allowed to leave alone or drive yourself home. Your surgery will be cancelled if you do not have a ride home. Also for your safety, it is strongly suggested that someone stay with you the first 24 hours after your surgery. A parent or legal guardian must accompany a child scheduled for surgery and plan to stay at the hospital until the child is discharged. Please do not bring other children with you. For your comfort, please wear simple loose fitting clothing to the hospital.  Please do not bring valuables.     Do not wear any make-up or nail polish on your fingers or toes      For your safety, please do not wear any jewelry or body piercing's on the day of surgery. All jewelry must be removed. If you have dentures, they will be removed before going to operating room. For your convenience, we will provide you with a container. If you wear contact lenses or glasses, they will be removed, please bring a case for them. If you have a living will and a durable power of  for healthcare, please bring in a copy. As part of our patient safety program to minimize surgical site infections, we ask you to do the following:    Please notify your surgeon if you develop any illness between         now and the  day of your surgery. This includes a cough, cold, fever, sore throat, nausea,         or vomiting, and diarrhea, etc.   Please notify your surgeon if you experience dizziness, shortness         of breath or blurred vision between now and the time of your surgery. Do not shave your operative site 96 hours prior to surgery. For face and neck surgery, men may use an electric razor 48 hours   prior to surgery. You may shower the night before surgery or the morning of   your surgery with an antibacterial soap. You will need to bring a photo ID and insurance card    [facility] has an onsite pharmacy, would you like to utilize our pharmacy     If you will be staying overnight and use a C-pap machine, please bring   your C-pap to hospital     Our goal is to provide you with excellent care, therefore, visitors will be limited to two(2) in the room at a time so that we may focus on providing this care for you. Please contact pre-admission testing if you have any further questions. [facility] phone number:  [phone] PAT fax number:  [phone]  Please note these are generalized instructions for all surgical cases, you may be provided with more specific instructions according to your surgery.     C-Difficile admission screening and protocol:       * Admitted with diarrhea? [] YES    [x]  NO     *Prior history of C-Diff. In last 3 months? [] YES    [x]  NO     *Antibiotic use in the past 6-8 weeks? [x]  NO    []  YES                 If yes, which ANTIBIOTIC AND REASON______     *Prior hospitalization or nursing home in the last month? []  YES    [x]  NO        SAFETY FIRST. .call before you fall

## 2023-01-13 ENCOUNTER — HOSPITAL ENCOUNTER (OUTPATIENT)
Dept: WOMENS IMAGING | Age: 68
Discharge: HOME | End: 2023-01-13
Payer: MEDICARE

## 2023-01-13 VITALS — BODY MASS INDEX: 27.48 KG/M2 | WEIGHT: 140 LBS | HEIGHT: 60 IN

## 2023-01-13 DIAGNOSIS — Z12.31 ENCOUNTER FOR SCREENING MAMMOGRAM FOR MALIGNANT NEOPLASM OF BREAST: ICD-10-CM

## 2023-01-13 PROCEDURE — 77063 BREAST TOMOSYNTHESIS BI: CPT

## 2023-01-16 ENCOUNTER — TELEPHONE (OUTPATIENT)
Dept: ORTHOPEDIC SURGERY | Age: 68
End: 2023-01-16

## 2023-01-16 NOTE — TELEPHONE ENCOUNTER
Other PATIENT CALLED STATES THAT SHE CAN NO USE ANY ANITBACTERIAL SOAPS BECAUSE THEY ALL HAVE SCENTS. SHE WANTS TO KNOW WHAT ELSE CAN SHE USE.  PLS CALL TO [facility] [phone]

## 2023-01-16 NOTE — TELEPHONE ENCOUNTER
Spoke with the patient I gave her an idea for the soap she can use but if she is unable to there is no requirement for her to use antibacterial soap as this is not a total joint surgery.

## 2023-01-20 ENCOUNTER — ANESTHESIA EVENT (OUTPATIENT)
Dept: OPERATING ROOM | Age: 68
End: 2023-01-20
Payer: MEDICARE

## 2023-01-23 ENCOUNTER — TELEPHONE (OUTPATIENT)
Dept: ORTHOPEDIC SURGERY | Age: 68
End: 2023-01-23

## 2023-01-23 NOTE — TELEPHONE ENCOUNTER
General Question     Subject: PATIENT MAKING SURE HER SX IS AT [facility]. REQUESTING A CALL.   Patient: Delia Chin  Contact Number: [phone]

## 2023-01-24 ENCOUNTER — HOSPITAL ENCOUNTER (OUTPATIENT)
Age: 68
Setting detail: OUTPATIENT SURGERY
Discharge: HOME | End: 2023-01-24
Attending: ORTHOPAEDIC SURGERY | Admitting: ORTHOPAEDIC SURGERY
Payer: MEDICARE

## 2023-01-24 ENCOUNTER — ANESTHESIA (OUTPATIENT)
Dept: OPERATING ROOM | Age: 68
End: 2023-01-24
Payer: MEDICARE

## 2023-01-24 VITALS
TEMPERATURE: 97.7 F | WEIGHT: 139.66 LBS | OXYGEN SATURATION: 98 % | DIASTOLIC BLOOD PRESSURE: 84 MMHG | RESPIRATION RATE: 16 BRPM | HEART RATE: 70 BPM | HEIGHT: 60 IN | SYSTOLIC BLOOD PRESSURE: 129 MMHG | BODY MASS INDEX: 27.42 KG/M2

## 2023-01-24 PROCEDURE — 3700000000 HC ANESTHESIA ATTENDED CARE: Performed by: ORTHOPAEDIC SURGERY

## 2023-01-24 PROCEDURE — 6370000000 HC RX 637 (ALT 250 FOR IP): Performed by: ANESTHESIOLOGY

## 2023-01-24 PROCEDURE — 2500000003 HC RX 250 WO HCPCS

## 2023-01-24 PROCEDURE — 6360000002 HC RX W HCPCS

## 2023-01-24 PROCEDURE — 2580000003 HC RX 258: Performed by: ORTHOPAEDIC SURGERY

## 2023-01-24 PROCEDURE — 3700000001 HC ADD 15 MINUTES (ANESTHESIA): Performed by: ORTHOPAEDIC SURGERY

## 2023-01-24 PROCEDURE — 3600000005 HC SURGERY LEVEL 5 BASE: Performed by: ORTHOPAEDIC SURGERY

## 2023-01-24 PROCEDURE — 2709999900 HC NON-CHARGEABLE SUPPLY: Performed by: ORTHOPAEDIC SURGERY

## 2023-01-24 PROCEDURE — 2580000003 HC RX 258: Performed by: ANESTHESIOLOGY

## 2023-01-24 PROCEDURE — 7100000010 HC PHASE II RECOVERY - FIRST 15 MIN: Performed by: ORTHOPAEDIC SURGERY

## 2023-01-24 PROCEDURE — 2500000003 HC RX 250 WO HCPCS: Performed by: ORTHOPAEDIC SURGERY

## 2023-01-24 PROCEDURE — 7100000000 HC PACU RECOVERY - FIRST 15 MIN: Performed by: ORTHOPAEDIC SURGERY

## 2023-01-24 PROCEDURE — 3600000015 HC SURGERY LEVEL 5 ADDTL 15MIN: Performed by: ORTHOPAEDIC SURGERY

## 2023-01-24 PROCEDURE — 7100000011 HC PHASE II RECOVERY - ADDTL 15 MIN: Performed by: ORTHOPAEDIC SURGERY

## 2023-01-24 PROCEDURE — A4217 STERILE WATER/SALINE, 500 ML: HCPCS | Performed by: ORTHOPAEDIC SURGERY

## 2023-01-24 PROCEDURE — 7100000001 HC PACU RECOVERY - ADDTL 15 MIN: Performed by: ORTHOPAEDIC SURGERY

## 2023-01-24 RX ORDER — SODIUM CHLORIDE 0.9 % (FLUSH) 0.9 %
5-40 SYRINGE (ML) INJECTION PRN
Status: DISCONTINUED | OUTPATIENT
Start: 2023-01-24 | End: 2023-01-24 | Stop reason: HOSPADM

## 2023-01-24 RX ORDER — SODIUM CHLORIDE 9 MG/ML
INJECTION, SOLUTION INTRAVENOUS PRN
Status: DISCONTINUED | OUTPATIENT
Start: 2023-01-24 | End: 2023-01-24 | Stop reason: HOSPADM

## 2023-01-24 RX ORDER — PROPOFOL 10 MG/ML
INJECTION, EMULSION INTRAVENOUS PRN
Status: DISCONTINUED | OUTPATIENT
Start: 2023-01-24 | End: 2023-01-24 | Stop reason: SDUPTHER

## 2023-01-24 RX ORDER — DEXAMETHASONE SODIUM PHOSPHATE 4 MG/ML
INJECTION, SOLUTION INTRA-ARTICULAR; INTRALESIONAL; INTRAMUSCULAR; INTRAVENOUS; SOFT TISSUE PRN
Status: DISCONTINUED | OUTPATIENT
Start: 2023-01-24 | End: 2023-01-24 | Stop reason: SDUPTHER

## 2023-01-24 RX ORDER — BUPIVACAINE HYDROCHLORIDE 5 MG/ML
INJECTION, SOLUTION EPIDURAL; INTRACAUDAL
Status: COMPLETED | OUTPATIENT
Start: 2023-01-24 | End: 2023-01-24

## 2023-01-24 RX ORDER — SODIUM CHLORIDE 0.9 % (FLUSH) 0.9 %
5-40 SYRINGE (ML) INJECTION EVERY 12 HOURS SCHEDULED
Status: DISCONTINUED | OUTPATIENT
Start: 2023-01-24 | End: 2023-01-24 | Stop reason: HOSPADM

## 2023-01-24 RX ORDER — MAGNESIUM HYDROXIDE 1200 MG/15ML
LIQUID ORAL CONTINUOUS PRN
Status: COMPLETED | OUTPATIENT
Start: 2023-01-24 | End: 2023-01-24

## 2023-01-24 RX ORDER — LIDOCAINE HYDROCHLORIDE 20 MG/ML
INJECTION, SOLUTION EPIDURAL; INFILTRATION; INTRACAUDAL; PERINEURAL PRN
Status: DISCONTINUED | OUTPATIENT
Start: 2023-01-24 | End: 2023-01-24 | Stop reason: SDUPTHER

## 2023-01-24 RX ORDER — ACETAMINOPHEN 500 MG
1000 TABLET ORAL ONCE
Status: COMPLETED | OUTPATIENT
Start: 2023-01-24 | End: 2023-01-24

## 2023-01-24 RX ORDER — FENTANYL CITRATE 50 UG/ML
25 INJECTION, SOLUTION INTRAMUSCULAR; INTRAVENOUS EVERY 5 MIN PRN
Status: DISCONTINUED | OUTPATIENT
Start: 2023-01-24 | End: 2023-01-24 | Stop reason: HOSPADM

## 2023-01-24 RX ORDER — ONDANSETRON 2 MG/ML
INJECTION INTRAMUSCULAR; INTRAVENOUS PRN
Status: DISCONTINUED | OUTPATIENT
Start: 2023-01-24 | End: 2023-01-24 | Stop reason: SDUPTHER

## 2023-01-24 RX ORDER — ONDANSETRON 2 MG/ML
4 INJECTION INTRAMUSCULAR; INTRAVENOUS
Status: DISCONTINUED | OUTPATIENT
Start: 2023-01-24 | End: 2023-01-24 | Stop reason: HOSPADM

## 2023-01-24 RX ADMIN — SODIUM CHLORIDE: 9 INJECTION, SOLUTION INTRAVENOUS at 07:17

## 2023-01-24 RX ADMIN — ACETAMINOPHEN 1000 MG: 500 TABLET ORAL at 09:13

## 2023-01-24 RX ADMIN — PROPOFOL 150 MG: 10 INJECTION, EMULSION INTRAVENOUS at 07:52

## 2023-01-24 RX ADMIN — DEXAMETHASONE SODIUM PHOSPHATE 10 MG: 4 INJECTION, SOLUTION INTRAMUSCULAR; INTRAVENOUS at 07:53

## 2023-01-24 RX ADMIN — ONDANSETRON 4 MG: 2 INJECTION INTRAMUSCULAR; INTRAVENOUS at 07:53

## 2023-01-24 RX ADMIN — SODIUM CHLORIDE: 9 INJECTION, SOLUTION INTRAVENOUS at 07:50

## 2023-01-24 RX ADMIN — LIDOCAINE HYDROCHLORIDE 100 MG: 20 INJECTION, SOLUTION EPIDURAL; INFILTRATION; INTRACAUDAL; PERINEURAL at 07:52

## 2023-01-24 ASSESSMENT — PAIN DESCRIPTION - ONSET
ONSET: ON-GOING

## 2023-01-24 ASSESSMENT — PAIN - FUNCTIONAL ASSESSMENT
PAIN_FUNCTIONAL_ASSESSMENT: PREVENTS OR INTERFERES SOME ACTIVE ACTIVITIES AND ADLS
PAIN_FUNCTIONAL_ASSESSMENT: PREVENTS OR INTERFERES SOME ACTIVE ACTIVITIES AND ADLS
PAIN_FUNCTIONAL_ASSESSMENT: 0-10
PAIN_FUNCTIONAL_ASSESSMENT: PREVENTS OR INTERFERES SOME ACTIVE ACTIVITIES AND ADLS
PAIN_FUNCTIONAL_ASSESSMENT: PREVENTS OR INTERFERES SOME ACTIVE ACTIVITIES AND ADLS

## 2023-01-24 ASSESSMENT — PAIN SCALES - GENERAL
PAINLEVEL_OUTOF10: 0
PAINLEVEL_OUTOF10: 0
PAINLEVEL_OUTOF10: 5
PAINLEVEL_OUTOF10: 7

## 2023-01-24 ASSESSMENT — PAIN DESCRIPTION - ORIENTATION
ORIENTATION: LEFT

## 2023-01-24 ASSESSMENT — PAIN DESCRIPTION - DESCRIPTORS
DESCRIPTORS: DISCOMFORT
DESCRIPTORS: DISCOMFORT
DESCRIPTORS: ACHING
DESCRIPTORS: DISCOMFORT

## 2023-01-24 ASSESSMENT — PAIN DESCRIPTION - FREQUENCY
FREQUENCY: CONTINUOUS

## 2023-01-24 ASSESSMENT — PAIN DESCRIPTION - LOCATION
LOCATION: SHOULDER

## 2023-01-24 ASSESSMENT — PAIN DESCRIPTION - PAIN TYPE
TYPE: SURGICAL PAIN

## 2023-01-24 ASSESSMENT — ENCOUNTER SYMPTOMS: SHORTNESS OF BREATH: 0

## 2023-01-24 NOTE — ANESTHESIA POSTPROCEDURE EVALUATION
Department of Anesthesiology  Postprocedure Note    Patient: Ellne Lal  MRN: [id number]  YOB: 1955  Date of evaluation: 1/24/2023      Procedure Summary     Date: 01/24/23 Room / Location: [address]    Anesthesia Start: 4742 Anesthesia Stop: 0813    Procedures:       LEFT ULNAR NERVE DECOMPRESSION (AT ELBOW) (Left: Elbow)      LEFT CARPAL TUNNEL RELEASE (Left: Hand) Diagnosis:       Cubital tunnel syndrome on left      Left carpal tunnel syndrome      (LEFT CUBITAL TUNNEL SYNDROME / ULNAR NERVE ENTRAPMENT AT ELBOW AND LEFT CARPAL TUNNEL SYNDROME)    Surgeons: Vianca Chavez MD Responsible Provider: Argelia Lara MD    Anesthesia Type: general ASA Status: 3          Anesthesia Type: No value filed.     Khai Phase I: Khai Score: 10    Khai Phase II: Khai Score: 10      Anesthesia Post Evaluation    Patient location during evaluation: PACU  Patient participation: complete - patient participated  Level of consciousness: awake and alert  Airway patency: patent  Nausea & Vomiting: no nausea and no vomiting  Complications: no  Cardiovascular status: hemodynamically stable  Respiratory status: acceptable  Hydration status: stable

## 2023-01-24 NOTE — PROGRESS NOTES
Patient admitted to PACU # 5 from OR at 0815 post LEFT ULNAR NERVE DECOMPRESSION (AT ELBOW) - Left per MD Trish Tafoya. Attached to PACU monitoring system and report received from anesthesia provider. Patient was reported to be hemodynamically stable during procedure. Patient drowsy on admission and denied pain. Dressing is clean, dry, and intact. Arm elevated on pillow. Will continue to monitor.

## 2023-01-24 NOTE — DISCHARGE INSTRUCTIONS
Post-Operative Instructions    Ulnar Nerve Release:    Keep hand elevated with fingers above eye-level to control swelling. Keep hand and bandage clean and dry. Do not change or unwrap bandage. Please leave bandage in place until your follow-up appointment. Maintain finger motion by fully straightening and fully bending fingers and thumb at least once an hour (while awake). This may cause some discomfort, but will not damage surgery. You may use your operated hand for lightweight tasks (e.g. writing, eating, dressing, etc.). NO LIFTING, CARRYING OR HEAVY USE. Most Patients do not have \"Serious Pain\" after this procedure and thus most patients do not require prescription pain medication. You may take over the counter medication (Tylenol, Advil, Aleve, etc.) as needed. If you are unable to tolerate the discomfort after your surgery and the OTC medications do not provide some relief, you may contact our office to discuss other options. .    Please call the office at [phone]  in 24 - 48 hours to schedule a follow up appointment for approximately 7 - 10 days after surgery. Please call the office at [phone]  if you have any questions or problems. Tao Gilmore MD

## 2023-01-24 NOTE — PROGRESS NOTES
PACU Transfer to [facility]    Vitals:    01/24/23 0855   BP: 129/77   Pulse: 70   Resp: 20   Temp: 97.8 °F (36.6 °C)   SpO2: 97%         Intake/Output Summary (Last 24 hours) at 1/24/2023 0857  Last data filed at 1/24/2023 1502  Gross per 24 hour   Intake 500 ml   Output --   Net 500 ml       Pain assessment:  present - adequately treated  Pain Level: 7 (tingling/numbness)    Patient transferred to care of RAJIV RN.    1/24/2023 8:57 AM

## 2023-01-24 NOTE — ANESTHESIA PRE PROCEDURE
Department of Anesthesiology  Preprocedure Note       Name:  Silvia Loyd   Age:  78 y.o.  :  1955                                          MRN:  [id number]         Date:  2023      Surgeon: Dede Anthony):  Samantha Suero MD    Procedure: Procedure(s):  LEFT ULNAR NERVE DECOMPRESSION (AT ELBOW)  LEFT CARPAL TUNNEL RELEASE    Medications prior to admission:   Prior to Admission medications    Medication Sig Start Date End Date Taking? Authorizing Provider   FIBER ADULT GUMMIES PO Take by mouth daily   Yes Historical Provider, MD   omeprazole (PRILOSEC) 20 MG delayed release capsule Take 40 mg by mouth daily   Yes Historical Provider, MD   tiZANidine (ZANAFLEX) 2 MG tablet One to two pills tid prn for leg pain 23   Radha Myers MD   levothyroxine (SYNTHROID) 50 MCG tablet TAKE ONE TABLET BY MOUTH DAILY 22   Radha Myers MD   fluticasone (FLOVENT HFA) 110 MCG/ACT inhaler Inhale 2 puffs into the lungs 2 times daily 22  Radha Myers MD   albuterol sulfate HFA (PROVENTIL;VENTOLIN;PROAIR) 108 (90 Base) MCG/ACT inhaler Inhale 2 puffs into the lungs every 6 hours as needed for Wheezing or Shortness of Breath 22   Radha Myers MD   montelukast (SINGULAIR) 10 MG tablet Take 1 tablet by mouth nightly 22   Radha Myers MD   nystatin (MYCOSTATIN) 648077 units TABS TAKE ONE TABLET BY MOUTH TWICE A DAY 22   Radha Myers MD   rivaroxaban (XARELTO) 20 MG TABS tablet Take 20 mg by mouth    Historical Provider, MD   mirabegron (MYRBETRIQ) 50 MG TB24 Take 50 mg by mouth daily as needed    Historical Provider, MD   fluticasone (FLONASE) 50 MCG/ACT nasal spray 1 spray by Nasal route daily  Patient taking differently: 1 spray by Nasal route daily as needed 10/10/17   Meron Irving,    Multiple Vitamins-Minerals (THERAPEUTIC MULTIVITAMIN-MINERALS) tablet Take 1 tablet by mouth daily.     Historical Provider, MD   Calcium Carbonate-Vitamin D (CALCIUM 600+D3 PO) Take 1 tablet by mouth 2 times daily     Historical Provider, MD   Biotin 2500 MCG CAPS Take  by mouth 2 times daily. Historical Provider, MD       Current medications:    Current Facility-Administered Medications   Medication Dose Route Frequency Provider Last Rate Last Admin    sodium chloride flush 0.9 % injection 5-40 mL  5-40 mL IntraVENous 2 times per day Skyler Stuart MD        sodium chloride flush 0.9 % injection 5-40 mL  5-40 mL IntraVENous PRN Skyler Stuart MD        0.9 % sodium chloride infusion   IntraVENous PRN Skyler Stuart MD           Allergies:     Allergies   Allergen Reactions    Seasonal Shortness Of Breath, Itching, Rash and Other (See Comments)     Allergic to scented perfume/shampoo/fabric softener/Mold/trees/grass-    Ciprofloxacin Nausea And Vomiting       Problem List:    Patient Active Problem List   Diagnosis Code    Asthma J45.909    ASD (atrial septal defect) Q21.10    Urethral stricture N35.919    DDD (degenerative disc disease), lumbar M51.36    Hypothyroid E03.9    S/P hysterectomy Z90.710    Abnormal ECG R94.31    Atrial flutter (HCC) I48.92    Cervical spondylosis without myelopathy M47.812    H/O tachycardia-bradycardia syndrome Z86.79    Cardiac pacemaker in situ Z95.0    SSS (sick sinus syndrome) (HCC) I49.5    Primary osteoarthritis of left hip M16.12    Allergic rhinitis J30.9    Dysrhythmia, cardiac I49.9    Irritable bowel syndrome K58.9       Past Medical History:        Diagnosis Date    Allergic rhinitis     Asthma     Atrial flutter (HCC)     Atrial septal defect     Constipation     Environmental allergies     pt allergic to scents:  perfume, soap, fabric softener    GERD (gastroesophageal reflux disease)     H/O degenerative disc disease     Hypothyroidism     OAB (overactive bladder)     Pacemaker 2019    medtronic model # [id number]    SSS (sick sinus syndrome) ([facility] [place] [address]        Past Surgical History:        Procedure Laterality Date    ATRIAL ABLATION SURGERY      CARDIAC SURGERY      atrial septal defect--patched hole in heart     SECTION      x 3    COLONOSCOPY  2017    normal dr Emmanuel Mckeon     COLONOSCOPY  03/15/2012    WNL    CYSTOURETHROSCOPY/URETHRAL DILATION      HYSTERECTOMY (CERVIX STATUS UNKNOWN)  1985    complete AZEB BSO no cancer.   for fibroid tumors    PACEMAKER INSERTION      medtronic model # [id number]    PAIN MANAGEMENT PROCEDURE Bilateral 10/22/2020    BILATERAL L2, L3, L4, L5 MEDIAL BRANCH BLOCK WITH FLUOROSCOPY (80655, 77972, 19048) performed by Reagan Schaeffer MD at [address] [facility] Bilateral 2020    BILATERAL L5 TRANSFORAMINAL EPIDURAL STEROID INJECTION WITH FLUOROSCOPY performed by Reagan Schaeffer MD at [address] [facility] N/A 2020    C7-T1 INTERLAMINAR EPIDURAL STEROID INJECTION WITH FLUOROSCOPY performed by Reagan Schaeffer MD at [address] Right 2013    WRIST GANGLION EXCISION      right       Social History:    Social History     Tobacco Use    Smoking status: Former     Packs/day: 0.50     Years: 8.00     Pack years: 4.00     Types: Cigarettes     Start date: 1972     Quit date: 1980     Years since quittin.0    Smokeless tobacco: Never   Substance Use Topics    Alcohol use: Yes     Comment: rare, occ glass wine                                Counseling given: Not Answered      Vital Signs (Current):   Vitals:    23 1131 23 0702   BP:  122/78   Pulse:  72   Resp:  18   Temp:  97.9 °F (36.6 °C)   TempSrc:  Oral   SpO2:  97%   Weight: 135 lb (61.2 kg) 139 lb 10.6 oz (63.3 kg)   Height: 5' (1.524 m) 5' (1.524 m)                                              BP Readings from Last 3 Encounters:   23 122/78   23 123/80   22 126/78       NPO Status: Time of last liquid consumption:                         Time of last solid consumption:  Date of last liquid consumption: 01/23/23                        Date of last solid food consumption: 01/23/23    BMI:   Wt Readings from Last 3 Encounters:   01/24/23 139 lb 10.6 oz (63.3 kg)   01/13/23 140 lb (63.5 kg)   01/05/23 140 lb (63.5 kg)     Body mass index is 27.28 kg/m². CBC:   Lab Results   Component Value Date/Time    WBC 5.8 08/29/2022 03:02 PM    RBC 4.14 08/29/2022 03:02 PM    HGB 12.5 08/29/2022 03:02 PM    HCT 38.0 08/29/2022 03:02 PM    MCV 91.7 08/29/2022 03:02 PM    RDW 15.1 08/29/2022 03:02 PM     08/29/2022 03:02 PM       CMP:   Lab Results   Component Value Date/Time     08/29/2022 03:02 PM    K 3.5 08/29/2022 03:02 PM     08/29/2022 03:02 PM    CO2 26 08/29/2022 03:02 PM    BUN 12 08/29/2022 03:02 PM    CREATININE 0.6 08/29/2022 03:02 PM    GFRAA >60 08/29/2022 03:02 PM    GFRAA >60 03/15/2012 06:02 AM    AGRATIO 2.2 08/29/2022 03:02 PM    LABGLOM >60 08/29/2022 03:02 PM    GLUCOSE 74 08/29/2022 03:02 PM    PROT 6.7 08/29/2022 03:02 PM    CALCIUM 10.0 08/29/2022 03:02 PM    BILITOT 0.3 08/29/2022 03:02 PM    ALKPHOS 76 08/29/2022 03:02 PM    AST 24 08/29/2022 03:02 PM    ALT 17 08/29/2022 03:02 PM       POC Tests: No results for input(s): POCGLU, POCNA, POCK, POCCL, POCBUN, POCHEMO, POCHCT in the last 72 hours.     Coags:   Lab Results   Component Value Date/Time    PROTIME 10.4 09/09/2020 08:10 AM    INR 0.90 09/09/2020 08:10 AM    APTT 29.4 09/09/2020 08:10 AM       HCG (If Applicable): No results found for: PREGTESTUR, PREGSERUM, HCG, HCGQUANT     ABGs: No results found for: PHART, PO2ART, SLH4JMI, HLO1UHJ, BEART, I1OUMGBU     Type & Screen (If Applicable):  No results found for: LABABO, LABRH    Drug/Infectious Status (If Applicable):  No results found for: HIV, HEPCAB    COVID-19 Screening (If Applicable):   Lab Results   Component Value Date/Time    COVID19 NOT DETECTED 09/03/2020 12:57 PM           Anesthesia Evaluation  Patient summary reviewed no history of anesthetic complications:   Airway: Mallampati: II  TM distance: >3 FB   Neck ROM: full  Mouth opening: > = 3 FB   Dental:      Comment: No loose teeth    Pulmonary: breath sounds clear to auscultation  (+) asthma:     (-) COPD, shortness of breath, recent URI and sleep apnea                           Cardiovascular:    (+) pacemaker (SSS): pacemaker, dysrhythmias (s/p ablation): atrial flutter,     (-) hypertension, valvular problems/murmurs, past MI, CAD and no pulmonary hypertension      Rhythm: regular  Rate: normal  Echocardiogram reviewed               ROS comment: Hx repaired ASD    Study   date: 02/12/2021. Study time: 07:52 AM.  Location: [address] [place] [address]. -------------------------------------------------------------------   Study Conclusions     - Left ventricle: The cavity size is normal. Wall thickness is at   [facility] upper limits of normal. Systolic function was normal. The     calculated ejection fraction was in the range of 54% to 58%. Wall     motion was normal; there were no regional wall motion     abnormalities. Reason unable to assess diastolic dysfunction: Due     to atrial flutter/fibrillation. The stroke volume is 48ml. The     stroke index is 30ml/m^2. - Mitral valve: There was mild to moderate regurgitation.   - Inferior vena cava: The vessel was normal in size; the     respirophasic diameter changes were in the normal range (>= 50%);     findings are consistent with normal central venous pressure.   - Prominent coronary sinus.   - No significant change from 2017. Neuro/Psych:      (-) seizures, neuromuscular disease, TIA, CVA, headaches and psychiatric history           GI/Hepatic/Renal:   (+) GERD (occ, takes med prn):,      (-) PUD, hepatitis, liver disease, no renal disease and bowel prep       Endo/Other:    (+) hypothyroidism::., .    (-) diabetes mellitus, hyperthyroidism, blood dyscrasia, arthritis               Abdominal:             Vascular:           Other Findings: Anesthesia Plan      general     ASA 3       Induction: intravenous. MIPS: Prophylactic antiemetics administered. Anesthetic plan and risks discussed with patient and spouse. Plan discussed with CRNA. This pre-anesthesia assessment may be used as a history and physical.    DOS STAFF ADDENDUM:    Pt seen and examined, chart reviewed (including anesthesia, drug and allergy history). No interval changes to history and physical examination. Anesthetic plan, risks, benefits, alternatives, and personnel involved discussed with patient. Patient verbalized an understanding and agrees to proceed.       Cherry Cowart MD  January 24, 2023  7:25 AM        Cherry Cowart MD   1/24/2023

## 2023-01-24 NOTE — PROGRESS NOTES
Pt arrived to phase 2 from PACU. Pt awake and alert. Left arm dressing C/D/I. Fingers warm. Cap refill WNL. Pt rates left shoulder pain as 7/10.

## 2023-01-24 NOTE — OP NOTE
OPERATIVE REPORT          Patient:  Juan Pedro    YOB: 1955  Date of Service:  1/24/2023   Location:  [facility] OR      Preoperative Diagnoses:  Left  carpal tunnel syndrome & Left cubital tunnel syndrome     Postoperative Diagnoses:  Same    Procedures:   Left  Carpal Tunnel Release & Left Ulnar Nerve decompression at the Cubital Tunnel     Surgeon:    John Hudson. Bijan Martinez MD    Surgical Assistant:    MACHO Marino Assistant    Anesthesia:   General                                   Blood Loss:    Minimal         Complications:    None                          Tourniquet Time:   5 minutes      Indications: Ms. Juan Pedro is a 78y.o.  year old female with Left  carpal tunnel syndrome & Left cubital tunnel syndrome . I have discussed preoperatively with her  the complications, limitations, expectations, alternatives and risk of the planned surgical care which she understood & all of her  questions were answered. Ms. Juan Pedro has provided written informed consent to proceed. After written consent was obtained and the proper operative sites were identified and marked, Ms. Juan Pedro was brought to the operating room and placed in the supine position on the operating room table with the Left arm extended upon a hand table. General anesthesia was induced & the Left upper extremity was prepped and draped in the usual sterile fashion. Procedure:   After Esmarch exsanguination, the pneuomo-tourniquet was inflated to 250 millimeters of Mercury about the arm. Attention was turned to the medial elbow. A curvilinear incision was fashioned over the posterior medial aspect of the elbow centered between the medial epicondyle and the tip of the olecranon. Dissection was carried carefully through the subcutaneous tissue identifying and protecting the superficial neurovascular structures. The cubital tunnel was identified and cleared of overlying soft tissue. Careful incision allowed exposure of the ulnar nerve. The nerve was traced proximally and circumferential control of the nerve was obtained. It was dissected proximally to the level of the connection between the biceps and triceps muscles, approximately 3 cm proximal to the medial epicondyle. It was carefully mobilized from the medial intermuscular septum. It was traced distally, being completely freed along the course of the cubital tunnel, and was dissected distally to the level of the second motor branch as it split the heads of the FCU muscle. There was a tight fibrous band at the confluence of the heads of the FCU which was carefully divided. Digital palpation revealed that there were no further proximal or distal constriction about the nerve. The Ulnar Nerve was found to be stable in it's groove without tendency toward subluxation or snapping. Attention was turned to the palm. A 2 cm longitudinal incision was fashioned at the base of the palm, paralleling the longitudinal thenar crease. Dissection was carried carefully through the subcutaneous tissue identifying and protecting the neurovascular structures. The palmar fascia was incised longitudinally, exposing the transverse carpal ligament. The transverse carpal ligament was incised from its proximal to distal most extent, under direct visualization. The terminal 2 cm of antebrachial fascia was similarly incised under direct visualization. The contents of the carpal tunnel were inspected and found to be free of mass, lesion or other abnormality. Digital palpation revealed no further constriction about the median nerve. The incisions were all irrigated copiously with sterile saline for irrigation. The pneumo-tourniquet was deflated after a period of 5 minutes elevation & the fingers were immediately pink and well perfused. Hemostasis was easily obtained with direct pressure and electrocautery.   The incisions were closed in layers with interrupted sutures. The wounds were dressed with Adaptic & dry sterile dressings after local anesthetic was instilled for postoperative analgesia. Ms. Yvette Madrigal was awakened from anesthesia having tolerated the procedure without apparent complication. She was returned to the recovery room in stable condition. At the conclusion of the procedure, all needle, instrument and sponge counts were correct. Chris De La Paz MD   1/24/2023, 7:50 AM

## 2023-01-24 NOTE — H&P
Pre-operative Update of H&P:    I  have seen & examined Ms. Mona Byrd related solely to her hand and upper extremity conditions, prior to the scheduled procedure on the date of her surgery. The indications for the planned surgical procedure & and her upper-extremity condition are unchanged.

## 2023-01-27 PROBLEM — G56.00 CARPAL TUNNEL SYNDROME: Status: ACTIVE | Noted: 2023-01-27

## 2023-01-30 ENCOUNTER — OFFICE VISIT (OUTPATIENT)
Dept: ORTHOPEDIC SURGERY | Age: 68
End: 2023-01-30

## 2023-01-30 VITALS — BODY MASS INDEX: 27.29 KG/M2 | WEIGHT: 139 LBS | RESPIRATION RATE: 16 BRPM | HEIGHT: 60 IN

## 2023-01-30 DIAGNOSIS — G56.02 CARPAL TUNNEL SYNDROME OF LEFT WRIST: Primary | ICD-10-CM

## 2023-01-30 PROCEDURE — 99024 POSTOP FOLLOW-UP VISIT: CPT | Performed by: ORTHOPAEDIC SURGERY

## 2023-01-30 NOTE — PATIENT INSTRUCTIONS
Postoperative Instructions After Carpal Tunnel Release    Dr. Lorne Monsalve        After bandages are removed one week from surgery, you may chose to wear a small bandage over the incision if you wish, though you do not need to. Keep incision dry until sutures have fully dissolved  or it has been 12 - 14 days since your surgery. Thereafter, you may wash with mild soap and water and shower normally. Work hard on motion of the fingers and wrist, straightening each finger fully and bending each finger fully, bending wrist forward and bending wrist backwards. Do not be concerned if you experience discomfort. This will not damage the surgery. You may begin using the hand as it feels comfortable beginning 12 - 14 days from the day of surgery. You may not feel entirely comfortable gripping or lifting heavy objects for several weeks. You may expect to see some skin peel off around the incision. You may be left with a small area of pink baby skin. This is quite normal.  6. Once your stiches have fully disappeared & skin appears normal, you should begin gently massaging the incision with Vitamin E (may use Vitamin E lotion or contents of Vitamin E capsule). Thank you for choosing [facility]icians for your Hand and Upper Extremity needs. If we can be of any further assistance to you, please do not hesitate to contact us.     Office Phone Number:  [phone]  or  [phone]

## 2023-01-30 NOTE — PROGRESS NOTES
Ms. Hans Coronel returns today in follow-up of her recent left Ulnar Nerve Decompression (Cubital Tunnel Release) & Carpal Tunnel Release done approximately 1 week ago. She has done well noting moderate discomfort and no other reported complications. She notes pre-operative symptoms to be partially resolved at this time. Physical Exam:  Bandage intact and well cared for. Skin incision is healing well, without erythema, drainage or sign of infection. Digital range of motion is without significant limitation. Wrist range of motion is without significant limitation. Elbow range of motion is without significant limitation. Sensation is improved in the Median Innervated Digits and Ulnar Innervated Digits. Vascular examination reveals normal, good capillary refill, and good color. Swelling is minimal.  Sensory and Motor Median & Ulnar Nerve function is intact. Impression:  Ms. Hans Coronel is doing well after recent left Ulnar Nerve Decompression (Cubital Tunnel Release) &  Carpal Tunnel Release. Plan:  Ms. Hans Coronel is instructed in work on Active & Passive range of motion of the digits, wrist, & elbow. These modalities were specifically demonstrated to her today. We discussed the appropriateness of gradual resumption of use of the operated hand and the return to normal use as comfort allows. She is given instructions regarding management of the fresh surgical incision and progressive use of desensitization and tissue massage techniques. We discussed the appropriate expectations and timeline for symptom improvement. She is provided a written patient instruction sheet titled: Postoperative Instructions After Ulnar Nerve Decompression. I have asked Ms. Hans Coronel to follow-up with me or contact me by telephone over the next 2-4 weeks if her symptoms have not fully resolved or if she has not regained full & painless return of function.       She is also specifically instructed to return to the office or call for an appointment sooner if her symptoms are changing or worsening prior to that time.

## 2023-02-23 ENCOUNTER — OFFICE VISIT (OUTPATIENT)
Dept: FAMILY MEDICINE CLINIC | Age: 68
End: 2023-02-23
Payer: MEDICARE

## 2023-02-23 VITALS
DIASTOLIC BLOOD PRESSURE: 83 MMHG | WEIGHT: 143 LBS | BODY MASS INDEX: 27 KG/M2 | TEMPERATURE: 97.6 F | SYSTOLIC BLOOD PRESSURE: 126 MMHG | HEART RATE: 73 BPM | HEIGHT: 61 IN | OXYGEN SATURATION: 98 %

## 2023-02-23 DIAGNOSIS — L90.5 SCAR IRRITATION: ICD-10-CM

## 2023-02-23 DIAGNOSIS — I49.5 SSS (SICK SINUS SYNDROME) (HCC): Primary | ICD-10-CM

## 2023-02-23 DIAGNOSIS — Q21.10 ASD (ATRIAL SEPTAL DEFECT): ICD-10-CM

## 2023-02-23 DIAGNOSIS — G56.02 CARPAL TUNNEL SYNDROME ON LEFT: ICD-10-CM

## 2023-02-23 PROCEDURE — 99214 OFFICE O/P EST MOD 30 MIN: CPT | Performed by: INTERNAL MEDICINE

## 2023-02-23 PROCEDURE — 1123F ACP DISCUSS/DSCN MKR DOCD: CPT | Performed by: INTERNAL MEDICINE

## 2023-02-23 RX ORDER — DOXYCYCLINE HYCLATE 100 MG
100 TABLET ORAL 2 TIMES DAILY
Qty: 14 TABLET | Refills: 0 | Status: SHIPPED | OUTPATIENT
Start: 2023-02-23 | End: 2023-03-02

## 2023-02-23 SDOH — ECONOMIC STABILITY: FOOD INSECURITY: WITHIN THE PAST 12 MONTHS, THE FOOD YOU BOUGHT JUST DIDN'T LAST AND YOU DIDN'T HAVE MONEY TO GET MORE.: NEVER TRUE

## 2023-02-23 SDOH — ECONOMIC STABILITY: INCOME INSECURITY: HOW HARD IS IT FOR YOU TO PAY FOR THE VERY BASICS LIKE FOOD, HOUSING, MEDICAL CARE, AND HEATING?: NOT HARD AT ALL

## 2023-02-23 SDOH — ECONOMIC STABILITY: FOOD INSECURITY: WITHIN THE PAST 12 MONTHS, YOU WORRIED THAT YOUR FOOD WOULD RUN OUT BEFORE YOU GOT MONEY TO BUY MORE.: NEVER TRUE

## 2023-02-23 SDOH — ECONOMIC STABILITY: HOUSING INSECURITY
IN THE LAST 12 MONTHS, WAS THERE A TIME WHEN YOU DID NOT HAVE A STEADY PLACE TO SLEEP OR SLEPT IN A SHELTER (INCLUDING NOW)?: NO

## 2023-02-23 NOTE — PROGRESS NOTES
HSUBJECTIVE:  Cullen Sacks is a 78 y.o. female being evaluated for:    Chief Complaint   Patient presents with    Pre-op Exam       Fax number = [phone] Patient is needing clearance for dental  procedure . Swelling      Patient is having swelling on the hand .        HPI   Going in for dental cleaning and asking for clearance  Concerned about her heart hx   and hip replacement  SSS with pacer  ASD repair long time ago   [facility] [address] [facility]  She has been told by [facility] that she does not need antibiotic prophylaxis     CTS 1 month ago on her left hand  swelling at the suture line  dry without drainage   Using Aquaphor It splits and skin peels   Allergies   Allergen Reactions    Seasonal Shortness Of Breath, Itching, Rash and Other (See Comments)     Allergic to scented perfume/shampoo/fabric softener/Mold/trees/grass-    Ciprofloxacin Nausea And Vomiting     Current Outpatient Medications   Medication Sig Dispense Refill    doxycycline hyclate (VIBRA-TABS) 100 MG tablet Take 1 tablet by mouth 2 times daily for 7 days 14 tablet 0    FIBER ADULT GUMMIES PO Take by mouth daily      omeprazole (PRILOSEC) 20 MG delayed release capsule Take 40 mg by mouth daily      tiZANidine (ZANAFLEX) 2 MG tablet One to two pills tid prn for leg pain 90 tablet 0    levothyroxine (SYNTHROID) 50 MCG tablet TAKE ONE TABLET BY MOUTH DAILY 90 tablet 1    fluticasone (FLOVENT HFA) 110 MCG/ACT inhaler Inhale 2 puffs into the lungs 2 times daily 1 each 3    albuterol sulfate HFA (PROVENTIL;VENTOLIN;PROAIR) 108 (90 Base) MCG/ACT inhaler Inhale 2 puffs into the lungs every 6 hours as needed for Wheezing or Shortness of Breath 1 each 5    montelukast (SINGULAIR) 10 MG tablet Take 1 tablet by mouth nightly 90 tablet 3    nystatin (MYCOSTATIN) 912772 units TABS TAKE ONE TABLET BY MOUTH TWICE A  tablet 3    rivaroxaban (XARELTO) 20 MG TABS tablet Take 20 mg by mouth      mirabegron (MYRBETRIQ) 50 MG TB24 Take 50 mg by mouth daily as needed      fluticasone (FLONASE) 50 MCG/ACT nasal spray 1 spray by Nasal route daily (Patient taking differently: 1 spray by Nasal route daily as needed) 1 Bottle 11    Multiple Vitamins-Minerals (THERAPEUTIC MULTIVITAMIN-MINERALS) tablet Take 1 tablet by mouth daily. Calcium Carbonate-Vitamin D (CALCIUM 600+D3 PO) Take 1 tablet by mouth 2 times daily       Biotin 2500 MCG CAPS Take  by mouth 2 times daily. No current facility-administered medications for this visit. Social History     Socioeconomic History    Marital status:      Spouse name: Not on file    Number of children: Not on file    Years of education: Not on file    Highest education level: Not on file   Occupational History    Not on file   Tobacco Use    Smoking status: Former     Packs/day: 0.50     Years: 8.00     Pack years: 4.00     Types: Cigarettes     Start date: 1972     Quit date: 1980     Years since quittin.1    Smokeless tobacco: Never   Vaping Use    Vaping Use: Never used   Substance and Sexual Activity    Alcohol use: Yes     Comment: rare, occ glass wine    Drug use: Never    Sexual activity: Not Currently     Partners: Male   Other Topics Concern    Not on file   Social History Narrative    Not on file     Social Determinants of Health     Financial Resource Strain: Low Risk     Difficulty of Paying Living Expenses: Not hard at all   Food Insecurity: No Food Insecurity    Worried About [address] in the Last Year: Never true    [facility] in the Last Year: Never true   Transportation Needs: No Transportation Needs    Lack of Transportation (Medical): No    Lack of Transportation (Non-Medical):  No   Physical Activity: Inactive    Days of Exercise per Week: 0 days    Minutes of Exercise per Session: 0 min   Stress: Not on file   Social Connections: Not on file   Intimate Partner Violence: Not on file   Housing Stability: Unknown    Unable to Pay for Housing in the Last Year: Not on file    Number of Places Lived in the Last Year: Not on file    Unstable Housing in the Last Year: No      Past Medical History:   Diagnosis Date    Allergic rhinitis     Asthma     Atrial flutter (HCC)     Atrial septal defect     Constipation     Environmental allergies     pt allergic to scents:  perfume, soap, fabric softener    GERD (gastroesophageal reflux disease)     H/O degenerative disc disease     Hypothyroidism     OAB (overactive bladder)     Pacemaker 2019    medtronic model # [id number]    SSS (sick sinus syndrome) ([address]      Past Surgical History:   Procedure Laterality Date    ARM SURGERY Left 1/24/2023    LEFT ULNAR NERVE DECOMPRESSION (AT ELBOW) performed by Niya Daniels MD at [facility]      atrial septal defect--patched hole in heart    CARPAL TUNNEL RELEASE Left 1/24/2023    LEFT CARPAL TUNNEL RELEASE performed by Niya Daniels MD at [facility]      x 3    COLONOSCOPY  04/26/2017    normal dr Mildred Diehl     COLONOSCOPY  03/15/2012    WNL    CYSTOURETHROSCOPY/URETHRAL DILATION      HYSTERECTOMY (CERVIX STATUS UNKNOWN)  1985    complete AZEB BSO no cancer.   for fibroid tumors    PACEMAKER INSERTION  2019    medtronic model # [id number]    PAIN MANAGEMENT PROCEDURE Bilateral 10/22/2020    BILATERAL L2, L3, L4, L5 MEDIAL BRANCH BLOCK WITH FLUOROSCOPY (37688, 26065, 61565) performed by Laurie Le MD at [address] [place] [address] Bilateral 11/12/2020    BILATERAL L5 TRANSFORAMINAL EPIDURAL STEROID INJECTION WITH FLUOROSCOPY performed by Laurie Le MD at [address] [place] [address] N/A 12/22/2020    C7-T1 INTERLAMINAR EPIDURAL STEROID INJECTION WITH FLUOROSCOPY performed by Laurie Le MD at [place] [address] Right 12/02/2013    WRIST GANGLION EXCISION      right       Review of Systems   Constitutional:  Negative for activity change, chills, fatigue, fever and unexpected weight change. HENT:  Negative for congestion, dental problem (needing routine dental cleaning) and sinus pressure. Eyes:  Negative for visual disturbance. NO glaucoma    Respiratory:  Negative for apnea, cough and shortness of breath. Cardiovascular:  Negative for chest pain, palpitations and leg swelling. SSS with pacer and atrial flutter  Repaired ASD   Gastrointestinal:  Negative for abdominal pain, diarrhea, nausea and vomiting. No hepatitis or jaundice,  No hx of ulcer disease   Skin:         Scar problem as in HPI    Neurological:  Negative for dizziness, seizures, syncope, speech difficulty, weakness, light-headedness, numbness and headaches. Hematological:  Bruises/bleeds easily (bruises on xarelto). No hx of blood clots      OBJECTIVE:  /83 (Site: Left Upper Arm, Position: Sitting, Cuff Size: Medium Adult)   Pulse 73   Temp 97.6 °F (36.4 °C) (Temporal)   Ht 5' 1\" (1.549 m)   Wt 143 lb (64.9 kg)   LMP  (LMP Unknown)   SpO2 98%   BMI 27.02 kg/m²      Body mass index is 27.02 kg/m². Physical Exam  Constitutional:       General: She is not in acute distress. Appearance: Normal appearance. She is well-developed. She is not diaphoretic. HENT:      Head: Normocephalic and atraumatic. Right Ear: Tympanic membrane and ear canal normal.      Left Ear: Tympanic membrane and ear canal normal.      Nose: Nose normal.      Mouth/Throat:      Pharynx: Oropharynx is clear. Comments: Tartar and plaque on teeth   Eyes:      Conjunctiva/sclera: Conjunctivae normal.   Neck:      Thyroid: No thyroid mass or thyromegaly. Vascular: No carotid bruit. Cardiovascular:      Rate and Rhythm: Normal rate and regular rhythm. Heart sounds: No murmur heard. No friction rub. No gallop. Pulmonary:      Effort: Pulmonary effort is normal. No tachypnea or accessory muscle usage. Breath sounds: Normal breath sounds. No decreased breath sounds.    Chest: Chest wall: No tenderness. Abdominal:      General: There is no distension. Palpations: Abdomen is soft. Tenderness: There is no abdominal tenderness. Comments: No hepatosplenomegaly   Musculoskeletal:         General: No swelling. Cervical back: Neck supple. Muscular tenderness (Posterior) present. Comments: Left wrist scar closed  NO drainage peeling red skin around it  ? Allergic type rxn to try basilio hydrocortisone otc   Lymphadenopathy:      Cervical: No cervical adenopathy. Upper Body:      Right upper body: No supraclavicular adenopathy. Left upper body: No supraclavicular adenopathy. Skin:     General: Skin is warm and dry. Findings: No rash. Neurological:      General: No focal deficit present. Mental Status: She is alert. Gait: Gait normal.   Psychiatric:         Mood and Affect: Mood normal.         Speech: Speech normal.         Behavior: Behavior normal.         Thought Content: Thought content normal.       ASSESSMENT/PLAN:    Maria Victoria Beltran was seen today for pre-op exam and swelling. Diagnoses and all orders for this visit:    SSS (sick sinus syndrome) ([address] with pacer followed at [facility]    atrial flutter has remained in sinus post ablation on NOAC     ASD (atrial septal defect) sinus venosus type in 1986 no need for prophylaxis with dental procedure with remote repair     Carpal tunnel syndrome on left    Scar irritation hydrocortisone OTC   -     doxycycline hyclate (VIBRA-TABS) 100 MG tablet; Take 1 tablet by mouth 2 times daily for 7 days      Orders Placed This Encounter   Medications    doxycycline hyclate (VIBRA-TABS) 100 MG tablet     Sig: Take 1 tablet by mouth 2 times daily for 7 days     Dispense:  14 tablet     Refill:  0        Return if symptoms worsen or fail to improve. There are no Patient Instructions on file for this visit.

## 2023-03-04 ASSESSMENT — ENCOUNTER SYMPTOMS
COUGH: 0
DIARRHEA: 0
APNEA: 0
VOMITING: 0
SINUS PRESSURE: 0
ABDOMINAL PAIN: 0
SHORTNESS OF BREATH: 0
NAUSEA: 0

## 2023-04-13 ENCOUNTER — OFFICE VISIT (OUTPATIENT)
Dept: FAMILY MEDICINE CLINIC | Age: 68
End: 2023-04-13
Payer: MEDICARE

## 2023-04-13 VITALS
SYSTOLIC BLOOD PRESSURE: 112 MMHG | BODY MASS INDEX: 26.32 KG/M2 | HEART RATE: 75 BPM | OXYGEN SATURATION: 97 % | TEMPERATURE: 97.6 F | DIASTOLIC BLOOD PRESSURE: 81 MMHG | HEIGHT: 61 IN | WEIGHT: 139.4 LBS

## 2023-04-13 DIAGNOSIS — G56.92 NEUROPATHY, ARM, LEFT: ICD-10-CM

## 2023-04-13 DIAGNOSIS — H11.32 SUBCONJUNCTIVAL HEMORRHAGE OF LEFT EYE: Primary | ICD-10-CM

## 2023-04-13 DIAGNOSIS — M51.16 LUMBAR DISC DISEASE WITH RADICULOPATHY: ICD-10-CM

## 2023-04-13 PROCEDURE — 1123F ACP DISCUSS/DSCN MKR DOCD: CPT | Performed by: INTERNAL MEDICINE

## 2023-04-13 PROCEDURE — 99214 OFFICE O/P EST MOD 30 MIN: CPT | Performed by: INTERNAL MEDICINE

## 2023-04-13 RX ORDER — PREDNISONE 20 MG/1
40 TABLET ORAL DAILY
Qty: 20 TABLET | Refills: 0 | Status: SHIPPED | OUTPATIENT
Start: 2023-04-13 | End: 2023-04-23

## 2023-04-13 RX ORDER — TIZANIDINE 4 MG/1
TABLET ORAL
Qty: 90 TABLET | Refills: 0 | Status: SHIPPED | OUTPATIENT
Start: 2023-04-13

## 2023-04-13 RX ORDER — ERYTHROMYCIN 5 MG/G
OINTMENT OPHTHALMIC
Qty: 1.5 G | Refills: 0 | Status: SHIPPED | OUTPATIENT
Start: 2023-04-13 | End: 2023-04-23

## 2023-04-21 ASSESSMENT — ENCOUNTER SYMPTOMS
WHEEZING: 0
ABDOMINAL PAIN: 0
SINUS PAIN: 0
COUGH: 0
EYE PAIN: 0
NAUSEA: 0
SHORTNESS OF BREATH: 0
EYE ITCHING: 1
EYE REDNESS: 1
VOMITING: 0

## 2023-05-16 ENCOUNTER — OFFICE VISIT (OUTPATIENT)
Dept: FAMILY MEDICINE CLINIC | Age: 68
End: 2023-05-16
Payer: MEDICARE

## 2023-05-16 VITALS
BODY MASS INDEX: 25.86 KG/M2 | TEMPERATURE: 98.2 F | WEIGHT: 137 LBS | HEIGHT: 61 IN | OXYGEN SATURATION: 99 % | SYSTOLIC BLOOD PRESSURE: 127 MMHG | DIASTOLIC BLOOD PRESSURE: 80 MMHG | HEART RATE: 78 BPM

## 2023-05-16 DIAGNOSIS — M50.90 CERVICAL DISC DISEASE: ICD-10-CM

## 2023-05-16 DIAGNOSIS — M16.12 ARTHRITIS OF LEFT HIP: Primary | ICD-10-CM

## 2023-05-16 PROCEDURE — 1123F ACP DISCUSS/DSCN MKR DOCD: CPT | Performed by: INTERNAL MEDICINE

## 2023-05-16 PROCEDURE — 99213 OFFICE O/P EST LOW 20 MIN: CPT | Performed by: INTERNAL MEDICINE

## 2023-05-16 RX ORDER — TRAMADOL HYDROCHLORIDE 50 MG/1
50 TABLET ORAL 2 TIMES DAILY
Qty: 14 TABLET | Refills: 0 | Status: SHIPPED | OUTPATIENT
Start: 2023-05-16 | End: 2023-05-23

## 2023-05-16 NOTE — PROGRESS NOTES
SUBJECTIVE:  Chad Burleson is a 78 y.o. female being evaluated for:    Chief Complaint   Patient presents with    Hip Pain      Patient is having pain on the left side hip . Neck Pain      Patient is having pain on the neck . HPI   Left hip pain in her groin more medial  Problem walking on her left side  cannot sleep right  When walking knee sore  down leg to knee  Xr of hip with moderate to severe osteoarthritis     Neck hurting middle on the left side   Pain shoots down her left arm  every thing is tingling and numb  CT scan with central stenol stenosis  c5/6 disc osteophyte complex and moderate to severe multilevel foraminal stenosis worse on the left  Treated with steroids and muscle relaxer and did not help   Trouble feeling fingers and dropping stuff     Trouble sleeping   Allergies   Allergen Reactions    Seasonal Shortness Of Breath, Itching, Rash and Other (See Comments)     Allergic to scented perfume/shampoo/fabric softener/Mold/trees/grass-    Ciprofloxacin Nausea And Vomiting     Current Outpatient Medications   Medication Sig Dispense Refill    traMADol (ULTRAM) 50 MG tablet Take 1 tablet by mouth in the morning and at bedtime for 7 days. Intended supply: 7 days.  Take lowest dose possible to manage pain Max Daily Amount: 100 mg 14 tablet 0    FIBER ADULT GUMMIES PO Take by mouth daily      omeprazole (PRILOSEC) 20 MG delayed release capsule Take 2 capsules by mouth daily      levothyroxine (SYNTHROID) 50 MCG tablet TAKE ONE TABLET BY MOUTH DAILY 90 tablet 1    fluticasone (FLOVENT HFA) 110 MCG/ACT inhaler Inhale 2 puffs into the lungs 2 times daily 1 each 3    albuterol sulfate HFA (PROVENTIL;VENTOLIN;PROAIR) 108 (90 Base) MCG/ACT inhaler Inhale 2 puffs into the lungs every 6 hours as needed for Wheezing or Shortness of Breath 1 each 5    montelukast (SINGULAIR) 10 MG tablet Take 1 tablet by mouth nightly 90 tablet 3    nystatin (MYCOSTATIN) 597135 units TABS TAKE ONE TABLET BY MOUTH TWICE

## 2023-05-18 ENCOUNTER — OFFICE VISIT (OUTPATIENT)
Dept: ORTHOPEDIC SURGERY | Age: 68
End: 2023-05-18
Payer: MEDICARE

## 2023-05-18 VITALS — HEIGHT: 60 IN | WEIGHT: 138 LBS | BODY MASS INDEX: 27.09 KG/M2

## 2023-05-18 DIAGNOSIS — M25.552 LEFT HIP PAIN: Primary | ICD-10-CM

## 2023-05-18 DIAGNOSIS — M16.12 ARTHRITIS OF LEFT HIP: ICD-10-CM

## 2023-05-18 PROCEDURE — 1123F ACP DISCUSS/DSCN MKR DOCD: CPT | Performed by: PHYSICIAN ASSISTANT

## 2023-05-18 PROCEDURE — 99213 OFFICE O/P EST LOW 20 MIN: CPT | Performed by: PHYSICIAN ASSISTANT

## 2023-05-20 ASSESSMENT — ENCOUNTER SYMPTOMS
DIARRHEA: 0
ABDOMINAL PAIN: 0
COUGH: 0
NAUSEA: 0
BLOOD IN STOOL: 0
SHORTNESS OF BREATH: 0
VOMITING: 0

## 2023-05-22 ENCOUNTER — TELEPHONE (OUTPATIENT)
Dept: FAMILY MEDICINE CLINIC | Age: 68
End: 2023-05-22

## 2023-05-22 DIAGNOSIS — M50.90 CERVICAL DISC DISEASE: ICD-10-CM

## 2023-05-22 DIAGNOSIS — M16.12 ARTHRITIS OF LEFT HIP: ICD-10-CM

## 2023-05-22 RX ORDER — TRAMADOL HYDROCHLORIDE 50 MG/1
50 TABLET ORAL 2 TIMES DAILY
Qty: 14 TABLET | Refills: 0 | Status: SHIPPED | OUTPATIENT
Start: 2023-05-23 | End: 2023-05-30

## 2023-05-22 NOTE — TELEPHONE ENCOUNTER
Pt calling to see if she can get more pain meds for her back and neck.   Please call when complete  Berkley Hernandez     [phone]

## 2023-05-22 NOTE — PROGRESS NOTES
This dictation was done with [employer] ![employer] dictation and may contain mechanical errors related to translation. I have today reviewed with Fausto Portillo the clinically relevant, past medical history, medications, allergies, family history, social history, and Review Of Systems form the patients most recent history form & I have documented any details relevant to today's presenting complaints in my history below. Ms. Ankit Crawford's self-reported past medical history, medications, allergies, family history, social history, and Review Of Systems form has been scanned into the chart under the \"Media\" tab. Subjective:  Fausto Portillo is a 78 y.o. who is here complaining of pain in her left hip for actually years she saw orthopedic practice about 4 years ago had an injection. Pain is a 6 out of 10 she is currently taking tramadol. .  They were sent for x-rays including AP pelvis and a 2 view left hip. She had a right hip replacement and is doing well with. Patient Active Problem List   Diagnosis    Asthma    ASD (atrial septal defect)    Urethral stricture    DDD (degenerative disc disease), lumbar    Hypothyroid    S/P hysterectomy    Abnormal ECG    Atrial flutter (HCC)    Cervical spondylosis without myelopathy    H/O tachycardia-bradycardia syndrome    Cardiac pacemaker in situ    SSS (sick sinus syndrome) (HCC)    Primary osteoarthritis of left hip    Allergic rhinitis    Dysrhythmia, cardiac    Irritable bowel syndrome    Carpal tunnel syndrome           Current Outpatient Medications on File Prior to Visit   Medication Sig Dispense Refill    traMADol (ULTRAM) 50 MG tablet Take 1 tablet by mouth in the morning and at bedtime for 7 days. Intended supply: 7 days.  Take lowest dose possible to manage pain Max Daily Amount: 100 mg 14 tablet 0    FIBER ADULT GUMMIES PO Take by mouth daily      omeprazole (PRILOSEC) 20 MG delayed release capsule Take 2 capsules by mouth daily      levothyroxine (SYNTHROID) 50

## 2023-05-22 NOTE — TELEPHONE ENCOUNTER
Xr with mod arthritis of left hip scheduled for cortisone injection  not sure what is  up with her neck    I will refill the tramadol but try to to use every day bid please

## 2023-06-01 ENCOUNTER — OFFICE VISIT (OUTPATIENT)
Dept: ORTHOPEDIC SURGERY | Age: 68
End: 2023-06-01

## 2023-06-01 ENCOUNTER — HOSPITAL ENCOUNTER (OUTPATIENT)
Dept: GENERAL RADIOLOGY | Age: 68
Discharge: HOME | End: 2023-06-01
Payer: MEDICARE

## 2023-06-01 DIAGNOSIS — M16.12 ARTHRITIS OF LEFT HIP: ICD-10-CM

## 2023-06-01 DIAGNOSIS — M16.12 ARTHRITIS OF LEFT HIP: Primary | ICD-10-CM

## 2023-06-01 PROCEDURE — 2500000003 HC RX 250 WO HCPCS

## 2023-06-01 PROCEDURE — 77002 NEEDLE LOCALIZATION BY XRAY: CPT

## 2023-06-01 PROCEDURE — 20610 DRAIN/INJ JOINT/BURSA W/O US: CPT

## 2023-06-01 PROCEDURE — 6360000002 HC RX W HCPCS

## 2023-07-03 ENCOUNTER — HOSPITAL ENCOUNTER (OUTPATIENT)
Dept: CT IMAGING | Age: 68
Discharge: HOME | End: 2023-07-03
Payer: MEDICARE

## 2023-07-03 ENCOUNTER — HOSPITAL ENCOUNTER (OUTPATIENT)
Dept: INTERVENTIONAL RADIOLOGY/VASCULAR | Age: 68
Discharge: HOME | End: 2023-07-03
Payer: MEDICARE

## 2023-07-03 VITALS
SYSTOLIC BLOOD PRESSURE: 135 MMHG | HEART RATE: 71 BPM | BODY MASS INDEX: 26.84 KG/M2 | WEIGHT: 136.69 LBS | TEMPERATURE: 97.2 F | RESPIRATION RATE: 16 BRPM | OXYGEN SATURATION: 99 % | HEIGHT: 60 IN | DIASTOLIC BLOOD PRESSURE: 74 MMHG

## 2023-07-03 DIAGNOSIS — M54.2 CERVICAL PAIN: ICD-10-CM

## 2023-07-03 DIAGNOSIS — M50.90 CERVICAL DISC DISEASE: ICD-10-CM

## 2023-07-03 LAB
INR PPP: 0.96 (ref 0.84–1.16)
PLATELET # BLD AUTO: 201 K/UL (ref 135–450)
PROTHROMBIN TIME: 12.8 SEC (ref 11.5–14.8)

## 2023-07-03 PROCEDURE — 2709999900 FL MYELOGRAM CERVICAL S&I

## 2023-07-03 PROCEDURE — 85610 PROTHROMBIN TIME: CPT

## 2023-07-03 PROCEDURE — 85049 AUTOMATED PLATELET COUNT: CPT

## 2023-07-03 PROCEDURE — 2709999900 IR MYELOGRAM CERVICAL

## 2023-07-03 PROCEDURE — 36415 COLL VENOUS BLD VENIPUNCTURE: CPT

## 2023-07-03 PROCEDURE — 6360000004 HC RX CONTRAST MEDICATION: Performed by: NEUROLOGICAL SURGERY

## 2023-07-03 PROCEDURE — 62302 MYELOGRAPHY LUMBAR INJECTION: CPT

## 2023-07-03 PROCEDURE — 7100000010 HC PHASE II RECOVERY - FIRST 15 MIN

## 2023-07-03 PROCEDURE — 7100000011 HC PHASE II RECOVERY - ADDTL 15 MIN

## 2023-07-03 PROCEDURE — 72126 CT NECK SPINE W/DYE: CPT

## 2023-07-03 RX ADMIN — IOPAMIDOL 10 ML: 612 INJECTION, SOLUTION INTRATHECAL at 14:31

## 2023-07-03 ASSESSMENT — PAIN DESCRIPTION - DESCRIPTORS: DESCRIPTORS: ACHING;DISCOMFORT

## 2023-07-03 ASSESSMENT — PAIN - FUNCTIONAL ASSESSMENT: PAIN_FUNCTIONAL_ASSESSMENT: 0-10

## 2023-07-03 ASSESSMENT — PAIN SCALES - GENERAL: PAINLEVEL_OUTOF10: 7

## 2023-07-03 NOTE — BRIEF OP NOTE
Brief Postoperative Note    Annamarie Murray  YOB: 1955  [id number]    Pre-operative Diagnosis: cervical pain    Post-operative Diagnosis: Same    Procedure: Fluoroscopic-guided myelogram    Anesthesia: Local    Surgeons: Hari Saucedo MD    Estimated Blood Loss: Less than 5 mL    Complications: None    Specimens: None    Findings: Successful fluoroscopic-guided cervical myelogram.  .  Electronically signed by Hari Saucedo MD on 7/3/2023 at 2:16 PM

## 2023-07-03 NOTE — DISCHARGE INSTRUCTIONS
[facility]  [address] [place] [address] Drive  Telephone: [phone]      [facility] REC[facility]:  [id number]  TODAY'S DATE: July 3, 2023      POST MYELOGRAM DISCHARGE INSTRUCTIONS    1. If you develop a severe headache, not relieved with over the counter pain medication, contact Dr. Joanna Moss    2. You may remove the dressing from your back in the morning. 3.  If is ok to shower starting today, but do not submerge in water (ie pool, bath tub, etc) for four days. 4.  If you develop a fever, bleeding or drainage from the site, contact Dr. Joanna Moss    5. Try to drink 6-8 glasses of water a day for the next two days, this will help flush the contrast from your body. 7.  Please follow up with Dr. Joanna Moss for your results from this study.

## 2023-07-10 ENCOUNTER — OFFICE VISIT (OUTPATIENT)
Dept: FAMILY MEDICINE CLINIC | Age: 68
End: 2023-07-10
Payer: MEDICARE

## 2023-07-10 VITALS
DIASTOLIC BLOOD PRESSURE: 74 MMHG | HEART RATE: 78 BPM | TEMPERATURE: 97.7 F | SYSTOLIC BLOOD PRESSURE: 122 MMHG | HEIGHT: 60 IN | OXYGEN SATURATION: 98 % | BODY MASS INDEX: 26.35 KG/M2 | WEIGHT: 134.2 LBS

## 2023-07-10 DIAGNOSIS — N30.00 ACUTE CYSTITIS WITHOUT HEMATURIA: ICD-10-CM

## 2023-07-10 DIAGNOSIS — R82.998 LEUKOCYTES IN URINE: ICD-10-CM

## 2023-07-10 DIAGNOSIS — R30.0 BURNING WITH URINATION: Primary | ICD-10-CM

## 2023-07-10 LAB
BILIRUBIN, POC: NEGATIVE
BLOOD URINE, POC: NORMAL
CLARITY, POC: NORMAL
COLOR, POC: YELLOW
GLUCOSE URINE, POC: NEGATIVE
KETONES, POC: NEGATIVE
LEUKOCYTE EST, POC: NORMAL
NITRITE, POC: NEGATIVE
PH, POC: 6
PROTEIN, POC: NEGATIVE
SPECIFIC GRAVITY, POC: 1.01
UROBILINOGEN, POC: 0.2

## 2023-07-10 PROCEDURE — 99213 OFFICE O/P EST LOW 20 MIN: CPT | Performed by: NURSE PRACTITIONER

## 2023-07-10 PROCEDURE — 81002 URINALYSIS NONAUTO W/O SCOPE: CPT | Performed by: NURSE PRACTITIONER

## 2023-07-10 PROCEDURE — 1123F ACP DISCUSS/DSCN MKR DOCD: CPT | Performed by: NURSE PRACTITIONER

## 2023-07-10 RX ORDER — NITROFURANTOIN 25; 75 MG/1; MG/1
100 CAPSULE ORAL 2 TIMES DAILY
Qty: 20 CAPSULE | Refills: 0 | Status: SHIPPED | OUTPATIENT
Start: 2023-07-10 | End: 2023-07-20

## 2023-07-10 ASSESSMENT — ENCOUNTER SYMPTOMS
BACK PAIN: 1
ABDOMINAL PAIN: 0
NAUSEA: 0
VOMITING: 0

## 2023-07-10 NOTE — PROGRESS NOTES
Lakisha Parham (:  1955) is a 78 y.o. female,Established patient, here for evaluation of the following chief complaint(s):  Urinary Tract Infection (Past 10 days having burning and urine frequency )         ASSESSMENT/PLAN:  1. Burning with urination  - POCT Urinalysis no Micro    2. Leukocytes in urine  - Culture, Urine    3. Acute cystitis without hematuria  Take medication as prescribed, awaiting culture. Follow pt education included. Return if symptoms worsen or fail to improve. Subjective   SUBJECTIVE/OBJECTIVE:  HPI  Presents today with urinary urgency, frequency and dysuria for the last 10 days. States she though it was getting better but the symptoms are lingering.   PO intake is good, denies abd pain, flank pain, fevers, n/v.   Current Outpatient Medications   Medication Sig Dispense Refill    nitrofurantoin, macrocrystal-monohydrate, (MACROBID) 100 MG capsule Take 1 capsule by mouth 2 times daily for 10 days 20 capsule 0    FIBER ADULT GUMMIES PO Take by mouth daily      omeprazole (PRILOSEC) 20 MG delayed release capsule Take 2 capsules by mouth daily      levothyroxine (SYNTHROID) 50 MCG tablet TAKE ONE TABLET BY MOUTH DAILY 90 tablet 1    fluticasone (FLOVENT HFA) 110 MCG/ACT inhaler Inhale 2 puffs into the lungs 2 times daily 1 each 3    albuterol sulfate HFA (PROVENTIL;VENTOLIN;PROAIR) 108 (90 Base) MCG/ACT inhaler Inhale 2 puffs into the lungs every 6 hours as needed for Wheezing or Shortness of Breath 1 each 5    montelukast (SINGULAIR) 10 MG tablet Take 1 tablet by mouth nightly 90 tablet 3    nystatin (MYCOSTATIN) 108699 units TABS TAKE ONE TABLET BY MOUTH TWICE A  tablet 3    rivaroxaban (XARELTO) 20 MG TABS tablet Take 1 tablet by mouth      mirabegron (MYRBETRIQ) 50 MG TB24 Take 50 mg by mouth daily as needed      fluticasone (FLONASE) 50 MCG/ACT nasal spray 1 spray by Nasal route daily (Patient taking differently: 1 spray by Nasal route daily as needed) 1 Bottle

## 2023-07-12 LAB
BACTERIA UR CULT: ABNORMAL
ORGANISM: ABNORMAL

## 2023-07-31 ENCOUNTER — HOSPITAL ENCOUNTER (OUTPATIENT)
Dept: NUCLEAR MEDICINE | Age: 68
Discharge: HOME | End: 2023-07-31
Attending: NEUROLOGICAL SURGERY
Payer: MEDICARE

## 2023-07-31 DIAGNOSIS — M54.50 LOW BACK PAIN, UNSPECIFIED BACK PAIN LATERALITY, UNSPECIFIED CHRONICITY, UNSPECIFIED WHETHER SCIATICA PRESENT: ICD-10-CM

## 2023-07-31 DIAGNOSIS — M51.37 DEGENERATION OF LUMBAR OR LUMBOSACRAL INTERVERTEBRAL DISC: ICD-10-CM

## 2023-07-31 DIAGNOSIS — M54.16 LUMBAR RADICULOPATHY: ICD-10-CM

## 2023-07-31 PROCEDURE — 3430000000 HC RX DIAGNOSTIC RADIOPHARMACEUTICAL: Performed by: NEUROLOGICAL SURGERY

## 2023-07-31 PROCEDURE — A9503 TC99M MEDRONATE: HCPCS | Performed by: NEUROLOGICAL SURGERY

## 2023-07-31 PROCEDURE — 78306 BONE IMAGING WHOLE BODY: CPT | Performed by: NEUROLOGICAL SURGERY

## 2023-07-31 RX ORDER — TC 99M MEDRONATE 20 MG/10ML
25 INJECTION, POWDER, LYOPHILIZED, FOR SOLUTION INTRAVENOUS
Status: COMPLETED | OUTPATIENT
Start: 2023-07-31 | End: 2023-07-31

## 2023-07-31 RX ADMIN — TC 99M MEDRONATE 25 MILLICURIE: 20 INJECTION, POWDER, LYOPHILIZED, FOR SOLUTION INTRAVENOUS at 09:37

## 2023-08-22 DIAGNOSIS — J45.20 MILD INTERMITTENT ASTHMA WITHOUT COMPLICATION: ICD-10-CM

## 2023-08-22 RX ORDER — MONTELUKAST SODIUM 10 MG/1
TABLET ORAL
Qty: 90 TABLET | Refills: 1 | Status: SHIPPED | OUTPATIENT
Start: 2023-08-22

## 2023-08-29 RX ORDER — NYSTATIN 500000 [USP'U]/1
TABLET, COATED ORAL
Qty: 180 TABLET | Refills: 3 | Status: SHIPPED | OUTPATIENT
Start: 2023-08-29

## 2023-08-29 NOTE — TELEPHONE ENCOUNTER
Attempted to reach patient to verify the medication request for Nystatin tablets, left v/m to call the office back

## 2023-08-29 NOTE — TELEPHONE ENCOUNTER
Spoke to patient, she does take Nystatin on a continuous basis to prevent yeast infections and is requesting the refill

## 2023-09-01 ENCOUNTER — OFFICE VISIT (OUTPATIENT)
Dept: FAMILY MEDICINE CLINIC | Age: 68
End: 2023-09-01
Payer: MEDICARE

## 2023-09-01 VITALS
BODY MASS INDEX: 26 KG/M2 | SYSTOLIC BLOOD PRESSURE: 138 MMHG | TEMPERATURE: 97.8 F | HEIGHT: 60 IN | WEIGHT: 132.4 LBS | OXYGEN SATURATION: 98 % | DIASTOLIC BLOOD PRESSURE: 80 MMHG | HEART RATE: 74 BPM

## 2023-09-01 DIAGNOSIS — R30.0 BURNING WITH URINATION: Primary | ICD-10-CM

## 2023-09-01 DIAGNOSIS — J01.90 ACUTE BACTERIAL SINUSITIS: ICD-10-CM

## 2023-09-01 DIAGNOSIS — B96.89 ACUTE BACTERIAL SINUSITIS: ICD-10-CM

## 2023-09-01 LAB
BILIRUBIN, POC: NEGATIVE
BLOOD URINE, POC: NORMAL
CLARITY, POC: CLEAR
COLOR, POC: NORMAL
GLUCOSE URINE, POC: NEGATIVE
KETONES, POC: NEGATIVE
LEUKOCYTE EST, POC: NEGATIVE
NITRITE, POC: NEGATIVE
PH, POC: 6
PROTEIN, POC: NEGATIVE
SPECIFIC GRAVITY, POC: <=1.005
UROBILINOGEN, POC: 0.2

## 2023-09-01 PROCEDURE — 1123F ACP DISCUSS/DSCN MKR DOCD: CPT | Performed by: NURSE PRACTITIONER

## 2023-09-01 PROCEDURE — 99213 OFFICE O/P EST LOW 20 MIN: CPT | Performed by: NURSE PRACTITIONER

## 2023-09-01 PROCEDURE — 81002 URINALYSIS NONAUTO W/O SCOPE: CPT | Performed by: NURSE PRACTITIONER

## 2023-09-01 RX ORDER — METHYLPREDNISOLONE 4 MG/1
TABLET ORAL
Qty: 1 KIT | Refills: 0 | Status: SHIPPED | OUTPATIENT
Start: 2023-09-01 | End: 2023-09-07

## 2023-09-01 RX ORDER — CEFUROXIME AXETIL 250 MG/1
250 TABLET ORAL 2 TIMES DAILY
Qty: 14 TABLET | Refills: 0 | Status: SHIPPED | OUTPATIENT
Start: 2023-09-01 | End: 2023-09-08

## 2023-09-01 ASSESSMENT — ENCOUNTER SYMPTOMS
RHINORRHEA: 1
SORE THROAT: 1
BACK PAIN: 1
SHORTNESS OF BREATH: 0
SINUS PRESSURE: 1
CHEST TIGHTNESS: 0
WHEEZING: 0
COUGH: 1
ABDOMINAL PAIN: 0
SINUS PAIN: 1

## 2023-09-01 NOTE — PROGRESS NOTES
Tristan Liu (:  1955) is a 78 y.o. female,Established patient, here for evaluation of the following chief complaint(s):  Sore Throat (Throat has been very dry which is making it sore. Worried about diabeties. Glands feel sore in neck when swallowing. Dry for a couple of weeks and starting a couple of days ago the pain started. Sinuses are draining. ) and Urinary Tract Infection (Hurts off and on when urinating and some burning. Having urine frequency and left sided back pain. )         ASSESSMENT/PLAN:  1. Burning with urination  Ongoing, will treat for UTI discussed when to follow up. Follow pt education included. - POCT Urinalysis no Micro    2. Acute bacterial sinusitis  New. Will treat due to lingering symptoms. Continue saline nasal rinse. Return if symptoms worsen or fail to improve. Subjective   SUBJECTIVE/OBJECTIVE:  HPI  Presents today with sinus congestion. Yellow, green and blood tinged nasal drainage, dry throat, sinus congestion. Uses flonase intermittently due to dry nasal membranes. Using nasal saline rinse. PND causing coughing, denies wheezing or productive cough     Urinary frequency urgency and dysuria intermittently for the last 2 wks. Concerned for DM due to it runs in her family. Denies abd pain, fevers, n/v.     Current Outpatient Medications   Medication Sig Dispense Refill    cefUROXime (CEFTIN) 250 MG tablet Take 1 tablet by mouth 2 times daily for 7 days 14 tablet 0    methylPREDNISolone (MEDROL DOSEPACK) 4 MG tablet Take by mouth.  1 kit 0    nystatin (MYCOSTATIN) 506884 units TABS TAKE ONE TABLET BY MOUTH TWICE A  tablet 3    montelukast (SINGULAIR) 10 MG tablet TAKE ONE TABLET BY MOUTH ONCE NIGHTLY 90 tablet 1    FIBER ADULT GUMMIES PO Take by mouth daily      omeprazole (PRILOSEC) 20 MG delayed release capsule Take 2 capsules by mouth daily Takes it off and on      levothyroxine (SYNTHROID) 50 MCG tablet TAKE ONE TABLET BY MOUTH DAILY 90 tablet 1

## 2023-09-20 ENCOUNTER — OFFICE VISIT (OUTPATIENT)
Dept: FAMILY MEDICINE CLINIC | Age: 68
End: 2023-09-20
Payer: MEDICARE

## 2023-09-20 VITALS
SYSTOLIC BLOOD PRESSURE: 112 MMHG | WEIGHT: 132 LBS | BODY MASS INDEX: 25.91 KG/M2 | HEART RATE: 72 BPM | OXYGEN SATURATION: 98 % | DIASTOLIC BLOOD PRESSURE: 76 MMHG | HEIGHT: 60 IN | TEMPERATURE: 97.9 F

## 2023-09-20 DIAGNOSIS — R35.0 FREQUENCY OF URINATION: ICD-10-CM

## 2023-09-20 DIAGNOSIS — M50.90 CERVICAL DISC DISEASE: ICD-10-CM

## 2023-09-20 DIAGNOSIS — E03.9 ACQUIRED HYPOTHYROIDISM: ICD-10-CM

## 2023-09-20 DIAGNOSIS — I48.92 ATRIAL FLUTTER, UNSPECIFIED TYPE (HCC): ICD-10-CM

## 2023-09-20 DIAGNOSIS — Z13.220 SCREENING CHOLESTEROL LEVEL: ICD-10-CM

## 2023-09-20 DIAGNOSIS — K21.9 GASTROESOPHAGEAL REFLUX DISEASE, UNSPECIFIED WHETHER ESOPHAGITIS PRESENT: ICD-10-CM

## 2023-09-20 DIAGNOSIS — Z00.00 INITIAL MEDICARE ANNUAL WELLNESS VISIT: Primary | ICD-10-CM

## 2023-09-20 LAB
BILIRUBIN, POC: NORMAL
BLOOD URINE, POC: NORMAL
CLARITY, POC: CLEAR
COLOR, POC: NORMAL
GLUCOSE URINE, POC: NORMAL
KETONES, POC: NORMAL
LEUKOCYTE EST, POC: NORMAL
NITRITE, POC: NORMAL
PH, POC: 5.5
PROTEIN, POC: NORMAL
SPECIFIC GRAVITY, POC: 1
UROBILINOGEN, POC: 0.2

## 2023-09-20 PROCEDURE — 1123F ACP DISCUSS/DSCN MKR DOCD: CPT | Performed by: INTERNAL MEDICINE

## 2023-09-20 PROCEDURE — G0438 PPPS, INITIAL VISIT: HCPCS | Performed by: INTERNAL MEDICINE

## 2023-09-20 PROCEDURE — 81002 URINALYSIS NONAUTO W/O SCOPE: CPT | Performed by: INTERNAL MEDICINE

## 2023-09-20 ASSESSMENT — ENCOUNTER SYMPTOMS
TROUBLE SWALLOWING: 0
COLOR CHANGE: 0
VOMITING: 0
DIARRHEA: 0
NAUSEA: 0
RHINORRHEA: 1
CONSTIPATION: 0
WHEEZING: 0
BLOOD IN STOOL: 0
COUGH: 0
BACK PAIN: 1
ABDOMINAL PAIN: 0
SHORTNESS OF BREATH: 0

## 2023-09-20 ASSESSMENT — PATIENT HEALTH QUESTIONNAIRE - PHQ9
SUM OF ALL RESPONSES TO PHQ QUESTIONS 1-9: 0
2. FEELING DOWN, DEPRESSED OR HOPELESS: 0
SUM OF ALL RESPONSES TO PHQ QUESTIONS 1-9: 0
SUM OF ALL RESPONSES TO PHQ9 QUESTIONS 1 & 2: 0
SUM OF ALL RESPONSES TO PHQ QUESTIONS 1-9: 0
SUM OF ALL RESPONSES TO PHQ QUESTIONS 1-9: 0
1. LITTLE INTEREST OR PLEASURE IN DOING THINGS: 0

## 2023-09-20 ASSESSMENT — LIFESTYLE VARIABLES
HOW OFTEN DO YOU HAVE A DRINK CONTAINING ALCOHOL: 2-4 TIMES A MONTH
HOW MANY STANDARD DRINKS CONTAINING ALCOHOL DO YOU HAVE ON A TYPICAL DAY: 1 OR 2

## 2023-09-20 NOTE — PROGRESS NOTES
Shortness of Breath Yes Adelina Luz MD   rivaroxaban (XARELTO) 20 MG TABS tablet Take 1 tablet by mouth Yes Historical Provider, MD   mirabegron (MYRBETRIQ) 50 MG TB24 Take 50 mg by mouth daily as needed Yes Historical Provider, MD   fluticasone (FLONASE) 50 MCG/ACT nasal spray 1 spray by Nasal route daily  Patient taking differently: 1 spray by Nasal route daily as needed Yes Meron Irving, DO   Multiple Vitamins-Minerals (THERAPEUTIC MULTIVITAMIN-MINERALS) tablet Take 1 tablet by mouth daily Yes Historical Provider, MD   Calcium Carbonate-Vitamin D (CALCIUM 600+D3 PO) Take 1 tablet by mouth 2 times daily  Yes Historical Provider, MD   Biotin 2500 MCG CAPS Take  by mouth 2 times daily.  Yes Historical Provider, MD   fluticasone (FLOVENT HFA) 110 MCG/ACT inhaler Inhale 2 puffs into the lungs 2 times daily  Adelina Luz MD       [facility] (Including outside providers/suppliers regularly involved in providing care):   Patient Care Team:  Adelina Luz MD as PCP - General (Internal Medicine)  [facility]el[facility]  Shannen Reyna     Reviewed and updated this visit:  Tobacco  Allergies  Meds  Med Hx  Surg Hx  Soc Hx  Fam Hx

## 2023-09-22 ENCOUNTER — HOSPITAL ENCOUNTER (OUTPATIENT)
Dept: CT IMAGING | Age: 68
Discharge: HOME | End: 2023-09-22
Attending: NEUROLOGICAL SURGERY
Payer: MEDICARE

## 2023-09-22 DIAGNOSIS — M54.50 LOW BACK PAIN, UNSPECIFIED BACK PAIN LATERALITY, UNSPECIFIED CHRONICITY, UNSPECIFIED WHETHER SCIATICA PRESENT: ICD-10-CM

## 2023-09-22 DIAGNOSIS — M54.6 PAIN IN THORACIC SPINE: ICD-10-CM

## 2023-09-22 DIAGNOSIS — M54.2 CERVICALGIA: ICD-10-CM

## 2023-09-22 DIAGNOSIS — C79.9 METASTATIC SIGNET RING CELL CARCINOMA (HCC): ICD-10-CM

## 2023-09-22 LAB
PERFORMED ON: NORMAL
POC CREATININE: 0.8 MG/DL (ref 0.6–1.2)
POC SAMPLE TYPE: NORMAL

## 2023-09-22 PROCEDURE — 6360000004 HC RX CONTRAST MEDICATION: Performed by: NEUROLOGICAL SURGERY

## 2023-09-22 PROCEDURE — 70450 CT HEAD/BRAIN W/O DYE: CPT

## 2023-09-22 PROCEDURE — 72130 CT CHEST SPINE W/O & W/DYE: CPT

## 2023-09-22 PROCEDURE — 82565 ASSAY OF CREATININE: CPT

## 2023-09-22 RX ADMIN — IOPAMIDOL 75 ML: 755 INJECTION, SOLUTION INTRAVENOUS at 13:05

## 2023-09-27 DIAGNOSIS — I48.92 ATRIAL FLUTTER, UNSPECIFIED TYPE (HCC): ICD-10-CM

## 2023-09-27 DIAGNOSIS — K21.9 GASTROESOPHAGEAL REFLUX DISEASE, UNSPECIFIED WHETHER ESOPHAGITIS PRESENT: ICD-10-CM

## 2023-09-27 DIAGNOSIS — Z13.220 SCREENING CHOLESTEROL LEVEL: ICD-10-CM

## 2023-09-27 DIAGNOSIS — E03.9 ACQUIRED HYPOTHYROIDISM: ICD-10-CM

## 2023-09-27 LAB
ALBUMIN SERPL-MCNC: 4.6 G/DL (ref 3.4–5)
ALBUMIN/GLOB SERPL: 2.7 {RATIO} (ref 1.1–2.2)
ALP SERPL-CCNC: 72 U/L (ref 40–129)
ALT SERPL-CCNC: 17 U/L (ref 10–40)
ANION GAP SERPL CALCULATED.3IONS-SCNC: 7 MMOL/L (ref 3–16)
AST SERPL-CCNC: 21 U/L (ref 15–37)
BASOPHILS # BLD: 0 K/UL (ref 0–0.2)
BASOPHILS NFR BLD: 0.6 %
BILIRUB SERPL-MCNC: 0.3 MG/DL (ref 0–1)
BUN SERPL-MCNC: 15 MG/DL (ref 7–20)
CALCIUM SERPL-MCNC: 9.7 MG/DL (ref 8.3–10.6)
CHLORIDE SERPL-SCNC: 107 MMOL/L (ref 99–110)
CHOLEST SERPL-MCNC: 198 MG/DL (ref 0–199)
CO2 SERPL-SCNC: 29 MMOL/L (ref 21–32)
CREAT SERPL-MCNC: 0.6 MG/DL (ref 0.6–1.2)
DEPRECATED RDW RBC AUTO: 13.8 % (ref 12.4–15.4)
EOSINOPHIL # BLD: 0.1 K/UL (ref 0–0.6)
EOSINOPHIL NFR BLD: 3 %
GFR SERPLBLD CREATININE-BSD FMLA CKD-EPI: >60 ML/MIN/{1.73_M2}
GLUCOSE SERPL-MCNC: 90 MG/DL (ref 70–99)
HCT VFR BLD AUTO: 39.5 % (ref 36–48)
HDLC SERPL-MCNC: 67 MG/DL (ref 40–60)
HGB BLD-MCNC: 13.5 G/DL (ref 12–16)
LDLC SERPL CALC-MCNC: 113 MG/DL
LYMPHOCYTES # BLD: 1.6 K/UL (ref 1–5.1)
LYMPHOCYTES NFR BLD: 41.9 %
MCH RBC QN AUTO: 31.5 PG (ref 26–34)
MCHC RBC AUTO-ENTMCNC: 34.3 G/DL (ref 31–36)
MCV RBC AUTO: 91.9 FL (ref 80–100)
MONOCYTES # BLD: 0.4 K/UL (ref 0–1.3)
MONOCYTES NFR BLD: 11.4 %
NEUTROPHILS # BLD: 1.6 K/UL (ref 1.7–7.7)
NEUTROPHILS NFR BLD: 43.1 %
PLATELET # BLD AUTO: 213 K/UL (ref 135–450)
PMV BLD AUTO: 9.4 FL (ref 5–10.5)
POTASSIUM SERPL-SCNC: 4.4 MMOL/L (ref 3.5–5.1)
PROT SERPL-MCNC: 6.3 G/DL (ref 6.4–8.2)
RBC # BLD AUTO: 4.3 M/UL (ref 4–5.2)
SODIUM SERPL-SCNC: 143 MMOL/L (ref 136–145)
T4 FREE SERPL-MCNC: 1.3 NG/DL (ref 0.9–1.8)
TRIGL SERPL-MCNC: 90 MG/DL (ref 0–150)
TSH SERPL DL<=0.005 MIU/L-ACNC: 2.55 UIU/ML (ref 0.27–4.2)
VLDLC SERPL CALC-MCNC: 18 MG/DL
WBC # BLD AUTO: 3.8 K/UL (ref 4–11)

## 2023-09-28 DIAGNOSIS — E03.9 ACQUIRED HYPOTHYROIDISM: ICD-10-CM

## 2023-09-28 RX ORDER — LEVOTHYROXINE SODIUM 0.05 MG/1
50 TABLET ORAL DAILY
Qty: 90 TABLET | Refills: 1 | Status: SHIPPED | OUTPATIENT
Start: 2023-09-28

## 2024-04-02 ENCOUNTER — OFFICE VISIT (OUTPATIENT)
Dept: FAMILY MEDICINE CLINIC | Age: 69
End: 2024-04-02
Payer: MEDICARE

## 2024-04-02 ENCOUNTER — HOSPITAL ENCOUNTER (OUTPATIENT)
Age: 69
Discharge: HOME | End: 2024-04-02
Payer: MEDICARE

## 2024-04-02 ENCOUNTER — HOSPITAL ENCOUNTER (OUTPATIENT)
Dept: GENERAL RADIOLOGY | Age: 69
Discharge: HOME | End: 2024-04-02
Payer: MEDICARE

## 2024-04-02 VITALS
OXYGEN SATURATION: 98 % | SYSTOLIC BLOOD PRESSURE: 128 MMHG | RESPIRATION RATE: 16 BRPM | HEIGHT: 60 IN | HEART RATE: 73 BPM | WEIGHT: 122 LBS | TEMPERATURE: 97.8 F | DIASTOLIC BLOOD PRESSURE: 80 MMHG | BODY MASS INDEX: 23.95 KG/M2

## 2024-04-02 DIAGNOSIS — Z78.0 ENCOUNTER FOR OSTEOPOROSIS SCREENING IN ASYMPTOMATIC POSTMENOPAUSAL PATIENT: ICD-10-CM

## 2024-04-02 DIAGNOSIS — I48.92 ATRIAL FLUTTER, UNSPECIFIED TYPE (HCC): ICD-10-CM

## 2024-04-02 DIAGNOSIS — S29.9XXA RIB INJURY: ICD-10-CM

## 2024-04-02 DIAGNOSIS — Z13.820 ENCOUNTER FOR OSTEOPOROSIS SCREENING IN ASYMPTOMATIC POSTMENOPAUSAL PATIENT: ICD-10-CM

## 2024-04-02 DIAGNOSIS — R30.0 DYSURIA: ICD-10-CM

## 2024-04-02 DIAGNOSIS — R07.82 INTERCOSTAL PAIN: Primary | ICD-10-CM

## 2024-04-02 DIAGNOSIS — R07.82 INTERCOSTAL PAIN: ICD-10-CM

## 2024-04-02 LAB
BILIRUBIN, POC: NORMAL
BLOOD URINE, POC: NORMAL
CLARITY, POC: CLEAR
COLOR, POC: YELLOW
GLUCOSE URINE, POC: NORMAL
KETONES, POC: NORMAL
LEUKOCYTE EST, POC: NORMAL
NITRITE, POC: NORMAL
PH, POC: 6
PROTEIN, POC: NORMAL
SPECIFIC GRAVITY, POC: 1.01
UROBILINOGEN, POC: 0.2

## 2024-04-02 PROCEDURE — 99213 OFFICE O/P EST LOW 20 MIN: CPT | Performed by: INTERNAL MEDICINE

## 2024-04-02 PROCEDURE — 81002 URINALYSIS NONAUTO W/O SCOPE: CPT | Performed by: INTERNAL MEDICINE

## 2024-04-02 PROCEDURE — 1123F ACP DISCUSS/DSCN MKR DOCD: CPT | Performed by: INTERNAL MEDICINE

## 2024-04-02 PROCEDURE — 71101 X-RAY EXAM UNILAT RIBS/CHEST: CPT

## 2024-04-02 RX ORDER — TRAMADOL HYDROCHLORIDE 50 MG/1
50 TABLET ORAL PRN
COMMUNITY
Start: 2024-02-15

## 2024-04-02 SDOH — ECONOMIC STABILITY: INCOME INSECURITY: HOW HARD IS IT FOR YOU TO PAY FOR THE VERY BASICS LIKE FOOD, HOUSING, MEDICAL CARE, AND HEATING?: NOT HARD AT ALL

## 2024-04-02 SDOH — ECONOMIC STABILITY: FOOD INSECURITY: WITHIN THE PAST 12 MONTHS, THE FOOD YOU BOUGHT JUST DIDN'T LAST AND YOU DIDN'T HAVE MONEY TO GET MORE.: NEVER TRUE

## 2024-04-02 SDOH — ECONOMIC STABILITY: FOOD INSECURITY: WITHIN THE PAST 12 MONTHS, YOU WORRIED THAT YOUR FOOD WOULD RUN OUT BEFORE YOU GOT MONEY TO BUY MORE.: NEVER TRUE

## 2024-04-02 ASSESSMENT — PATIENT HEALTH QUESTIONNAIRE - PHQ9
SUM OF ALL RESPONSES TO PHQ QUESTIONS 1-9: 0
SUM OF ALL RESPONSES TO PHQ QUESTIONS 1-9: 0
2. FEELING DOWN, DEPRESSED OR HOPELESS: NOT AT ALL
SUM OF ALL RESPONSES TO PHQ QUESTIONS 1-9: 0
SUM OF ALL RESPONSES TO PHQ QUESTIONS 1-9: 0
SUM OF ALL RESPONSES TO PHQ9 QUESTIONS 1 & 2: 0
1. LITTLE INTEREST OR PLEASURE IN DOING THINGS: NOT AT ALL

## 2024-04-02 ASSESSMENT — ENCOUNTER SYMPTOMS
ABDOMINAL PAIN: 0
BLOOD IN STOOL: 0
NAUSEA: 0
SHORTNESS OF BREATH: 0
COUGH: 0
VOMITING: 0
WHEEZING: 0

## 2024-04-02 NOTE — PROGRESS NOTES
if persisitng steroids or pain pills holding for now   -     Cancel: XR RIBS RIGHT (2 VIEWS); Future  -     XR RIBS RIGHT INCLUDE CHEST (MIN 3 VIEWS); Future    Rib injury  -     Cancel: XR RIBS RIGHT (2 VIEWS); Future  -     XR RIBS RIGHT INCLUDE CHEST (MIN 3 VIEWS); Future    Atrial flutter, unspecified type (HCC) on xarelto    Dysuria  -     POCT Urinalysis no Micro    Encounter for osteoporosis screening in asymptomatic postmenopausal patient  -     DEXA BONE DENSITY AXIAL SKELETON; Future        No orders of the defined types were placed in this encounter.       Return if symptoms worsen or fail to improve.     There are no Patient Instructions on file for this visit.

## 2024-04-03 ENCOUNTER — TELEPHONE (OUTPATIENT)
Dept: FAMILY MEDICINE CLINIC | Age: 69
End: 2024-04-03

## 2024-04-03 RX ORDER — PREDNISONE 20 MG/1
20 TABLET ORAL DAILY
Qty: 10 TABLET | Refills: 0 | Status: SHIPPED | OUTPATIENT
Start: 2024-04-03 | End: 2024-04-14

## 2024-04-03 NOTE — TELEPHONE ENCOUNTER
Patient was given the results of her rib X-ray.   No fractures seen   She states she spoke with you regarding sending in a RX for inflammation.   Please advise.

## 2024-04-10 NOTE — TELEPHONE ENCOUNTER
Spoke with patient going over her CT results. She would like to opt in to injection for both cervical and lumbar as well and try PT for both. A referral for desi was placed and referral for PT was placed as well. None

## 2024-04-24 DIAGNOSIS — J45.20 MILD INTERMITTENT ASTHMA WITHOUT COMPLICATION: ICD-10-CM

## 2024-04-24 RX ORDER — MONTELUKAST SODIUM 10 MG/1
TABLET ORAL
Qty: 90 TABLET | Refills: 1 | Status: SHIPPED | OUTPATIENT
Start: 2024-04-24

## 2024-05-13 DIAGNOSIS — E03.9 ACQUIRED HYPOTHYROIDISM: ICD-10-CM

## 2024-05-13 RX ORDER — LEVOTHYROXINE SODIUM 0.05 MG/1
50 TABLET ORAL DAILY
Qty: 90 TABLET | Refills: 1 | Status: SHIPPED | OUTPATIENT
Start: 2024-05-13

## 2024-05-24 ENCOUNTER — HOSPITAL ENCOUNTER (OUTPATIENT)
Dept: WOMENS IMAGING | Age: 69
Discharge: HOME | End: 2024-05-24
Attending: INTERNAL MEDICINE
Payer: MEDICARE

## 2024-05-24 DIAGNOSIS — Z13.820 ENCOUNTER FOR OSTEOPOROSIS SCREENING IN ASYMPTOMATIC POSTMENOPAUSAL PATIENT: ICD-10-CM

## 2024-05-24 DIAGNOSIS — Z78.0 ENCOUNTER FOR OSTEOPOROSIS SCREENING IN ASYMPTOMATIC POSTMENOPAUSAL PATIENT: ICD-10-CM

## 2024-05-24 PROCEDURE — 77080 DXA BONE DENSITY AXIAL: CPT

## 2024-08-26 ENCOUNTER — TELEPHONE (OUTPATIENT)
Dept: FAMILY MEDICINE CLINIC | Age: 69
End: 2024-08-26

## 2024-08-26 NOTE — TELEPHONE ENCOUNTER
Patient called in complaining of sinus issues from her allergies. Has been going on for months and he has been taking benadryl but doesn't seem to help. Let side sinus pain that travels into her left eye and green discharge. Patient does not want to blow her nose due to hr getting bloody noses.     Please advise.     LOV: 04/02/24  NOV: None

## 2024-08-27 ENCOUNTER — OFFICE VISIT (OUTPATIENT)
Dept: FAMILY MEDICINE CLINIC | Age: 69
End: 2024-08-27
Payer: MEDICARE

## 2024-08-27 VITALS
HEIGHT: 60 IN | DIASTOLIC BLOOD PRESSURE: 86 MMHG | BODY MASS INDEX: 23.64 KG/M2 | RESPIRATION RATE: 16 BRPM | HEART RATE: 76 BPM | WEIGHT: 120.4 LBS | TEMPERATURE: 97.5 F | SYSTOLIC BLOOD PRESSURE: 128 MMHG | OXYGEN SATURATION: 98 %

## 2024-08-27 DIAGNOSIS — J45.20 MILD INTERMITTENT ASTHMA WITHOUT COMPLICATION: ICD-10-CM

## 2024-08-27 DIAGNOSIS — J32.0 LEFT MAXILLARY SINUSITIS: Primary | ICD-10-CM

## 2024-08-27 DIAGNOSIS — I48.92 ATRIAL FLUTTER, UNSPECIFIED TYPE (HCC): ICD-10-CM

## 2024-08-27 DIAGNOSIS — J31.0 CHRONIC RHINITIS: ICD-10-CM

## 2024-08-27 DIAGNOSIS — E03.9 ACQUIRED HYPOTHYROIDISM: ICD-10-CM

## 2024-08-27 PROCEDURE — 99213 OFFICE O/P EST LOW 20 MIN: CPT | Performed by: INTERNAL MEDICINE

## 2024-08-27 PROCEDURE — 1123F ACP DISCUSS/DSCN MKR DOCD: CPT | Performed by: INTERNAL MEDICINE

## 2024-08-27 RX ORDER — LEVOTHYROXINE SODIUM 50 UG/1
50 TABLET ORAL DAILY
Qty: 90 TABLET | Refills: 1 | Status: SHIPPED | OUTPATIENT
Start: 2024-08-27

## 2024-08-27 RX ORDER — FLUTICASONE PROPIONATE 50 MCG
1 SPRAY, SUSPENSION (ML) NASAL DAILY
Qty: 16 G | Refills: 2 | Status: SHIPPED | OUTPATIENT
Start: 2024-08-27

## 2024-08-27 RX ORDER — CEFUROXIME AXETIL 250 MG/1
250 TABLET ORAL 2 TIMES DAILY
Qty: 20 TABLET | Refills: 0 | Status: SHIPPED | OUTPATIENT
Start: 2024-08-27 | End: 2024-09-06

## 2024-08-27 RX ORDER — MIRABEGRON 50 MG/1
50 TABLET, EXTENDED RELEASE ORAL DAILY
Qty: 30 TABLET | Refills: 3 | Status: SHIPPED | OUTPATIENT
Start: 2024-08-27

## 2024-08-27 RX ORDER — ALBUTEROL SULFATE 90 UG/1
2 AEROSOL, METERED RESPIRATORY (INHALATION) EVERY 6 HOURS PRN
Qty: 1 EACH | Refills: 5 | Status: SHIPPED | OUTPATIENT
Start: 2024-08-27

## 2024-08-27 RX ORDER — MONTELUKAST SODIUM 10 MG/1
10 TABLET ORAL NIGHTLY
Qty: 90 TABLET | Refills: 1 | Status: SHIPPED | OUTPATIENT
Start: 2024-08-27

## 2024-08-27 RX ORDER — NYSTATIN 500000 [USP'U]/1
TABLET, COATED ORAL
Qty: 180 TABLET | Refills: 3 | Status: SHIPPED | OUTPATIENT
Start: 2024-08-27

## 2024-08-27 ASSESSMENT — PATIENT HEALTH QUESTIONNAIRE - PHQ9
SUM OF ALL RESPONSES TO PHQ QUESTIONS 1-9: 0
SUM OF ALL RESPONSES TO PHQ QUESTIONS 1-9: 0
1. LITTLE INTEREST OR PLEASURE IN DOING THINGS: NOT AT ALL
SUM OF ALL RESPONSES TO PHQ QUESTIONS 1-9: 0
SUM OF ALL RESPONSES TO PHQ9 QUESTIONS 1 & 2: 0
2. FEELING DOWN, DEPRESSED OR HOPELESS: NOT AT ALL
SUM OF ALL RESPONSES TO PHQ QUESTIONS 1-9: 0

## 2024-08-27 NOTE — PROGRESS NOTES
SUBJECTIVE:  Prabha Crawford is a 68 y.o. female being evaluated for:    Chief Complaint   Patient presents with    Sinus Problem     States that she's having sinus issues for the past few months. States it has gotten worse over the last week. States she also gets itchy all over and benadryl doesn't help. States she gets hot at night. Also getting headaches daily. States sinuses on face hurt primarily above left eye. States her inhalers haven't helped either.       HPI   Problems for quite a while and really bad for the last week  Itching on scalp and temples  Hurts behind and around her left eye  No ear pain or pressure    But can hear liquid in there at times  No dizzines  constant ringing in her ears  Nasal drainage once in a while  Using benadryl and it dries her out    No sore throat or post nasal drip  Mild sneezing  No coughing wheezig or sob   2 polyps recently removed  Allergies   Allergen Reactions    Seasonal Shortness Of Breath, Itching, Rash and Other (See Comments)     Allergic to scented perfume/shampoo/fabric softener/Mold/trees/grass-    Ciprofloxacin Nausea And Vomiting     Current Outpatient Medications   Medication Sig Dispense Refill    cefUROXime (CEFTIN) 250 MG tablet Take 1 tablet by mouth 2 times daily for 10 days 20 tablet 0    fluticasone (FLONASE) 50 MCG/ACT nasal spray 1 spray by Each Nostril route daily 16 g 2    albuterol sulfate HFA (PROVENTIL;VENTOLIN;PROAIR) 108 (90 Base) MCG/ACT inhaler Inhale 2 puffs into the lungs every 6 hours as needed for Wheezing or Shortness of Breath 1 each 5    levothyroxine (SYNTHROID) 50 MCG tablet Take 1 tablet by mouth daily 90 tablet 1    montelukast (SINGULAIR) 10 MG tablet Take 1 tablet by mouth nightly 90 tablet 1    mirabegron (MYRBETRIQ) 50 MG TB24 Take 50 mg by mouth daily 30 tablet 3    nystatin (MYCOSTATIN) 362086 units TABS TAKE ONE TABLET BY MOUTH TWICE A  tablet 3    SALINE NASAL SPRAY NA by Nasal route daily      Calcium Carbonate

## 2024-08-31 ASSESSMENT — ENCOUNTER SYMPTOMS
SORE THROAT: 0
SINUS PRESSURE: 1
VOMITING: 0
NAUSEA: 0
RHINORRHEA: 1
ABDOMINAL PAIN: 0
COUGH: 0
WHEEZING: 0
SHORTNESS OF BREATH: 0
DIARRHEA: 0

## 2024-09-03 DIAGNOSIS — I48.92 ATRIAL FLUTTER, UNSPECIFIED TYPE (HCC): ICD-10-CM

## 2024-09-03 DIAGNOSIS — E03.9 ACQUIRED HYPOTHYROIDISM: ICD-10-CM

## 2024-09-03 LAB
ANION GAP SERPL CALCULATED.3IONS-SCNC: 11 MMOL/L (ref 3–16)
BUN SERPL-MCNC: 19 MG/DL (ref 7–20)
CALCIUM SERPL-MCNC: 9.9 MG/DL (ref 8.3–10.6)
CHLORIDE SERPL-SCNC: 102 MMOL/L (ref 99–110)
CO2 SERPL-SCNC: 26 MMOL/L (ref 21–32)
CREAT SERPL-MCNC: 0.8 MG/DL (ref 0.6–1.2)
GFR SERPLBLD CREATININE-BSD FMLA CKD-EPI: 80 ML/MIN/{1.73_M2}
GLUCOSE SERPL-MCNC: 74 MG/DL (ref 70–99)
POTASSIUM SERPL-SCNC: 3.9 MMOL/L (ref 3.5–5.1)
SODIUM SERPL-SCNC: 139 MMOL/L (ref 136–145)
TSH SERPL DL<=0.005 MIU/L-ACNC: 0.89 UIU/ML (ref 0.27–4.2)

## 2024-11-09 DIAGNOSIS — E03.9 ACQUIRED HYPOTHYROIDISM: ICD-10-CM

## 2024-11-11 RX ORDER — LEVOTHYROXINE SODIUM 50 UG/1
50 TABLET ORAL DAILY
Qty: 90 TABLET | Refills: 1 | Status: SHIPPED | OUTPATIENT
Start: 2024-11-11

## 2024-11-12 ENCOUNTER — OFFICE VISIT (OUTPATIENT)
Dept: FAMILY MEDICINE CLINIC | Age: 69
End: 2024-11-12

## 2024-11-12 VITALS
DIASTOLIC BLOOD PRESSURE: 78 MMHG | HEART RATE: 77 BPM | HEIGHT: 56 IN | WEIGHT: 122.4 LBS | RESPIRATION RATE: 16 BRPM | SYSTOLIC BLOOD PRESSURE: 136 MMHG | BODY MASS INDEX: 27.53 KG/M2 | OXYGEN SATURATION: 98 % | TEMPERATURE: 98.1 F

## 2024-11-12 DIAGNOSIS — M15.0 PRIMARY OSTEOARTHRITIS INVOLVING MULTIPLE JOINTS: ICD-10-CM

## 2024-11-12 DIAGNOSIS — E03.9 ACQUIRED HYPOTHYROIDISM: ICD-10-CM

## 2024-11-12 DIAGNOSIS — I48.92 ATRIAL FLUTTER, UNSPECIFIED TYPE (HCC): ICD-10-CM

## 2024-11-12 DIAGNOSIS — Z51.81 MEDICATION MONITORING ENCOUNTER: ICD-10-CM

## 2024-11-12 DIAGNOSIS — Z00.00 MEDICARE ANNUAL WELLNESS VISIT, SUBSEQUENT: Primary | ICD-10-CM

## 2024-11-12 RX ORDER — CELECOXIB 200 MG/1
200 CAPSULE ORAL DAILY
Qty: 30 CAPSULE | Refills: 2 | Status: SHIPPED | OUTPATIENT
Start: 2024-11-12

## 2024-11-12 ASSESSMENT — PATIENT HEALTH QUESTIONNAIRE - PHQ9
SUM OF ALL RESPONSES TO PHQ QUESTIONS 1-9: 1
2. FEELING DOWN, DEPRESSED OR HOPELESS: NOT AT ALL
SUM OF ALL RESPONSES TO PHQ9 QUESTIONS 1 & 2: 1
SUM OF ALL RESPONSES TO PHQ QUESTIONS 1-9: 1
1. LITTLE INTEREST OR PLEASURE IN DOING THINGS: SEVERAL DAYS
SUM OF ALL RESPONSES TO PHQ QUESTIONS 1-9: 1
SUM OF ALL RESPONSES TO PHQ QUESTIONS 1-9: 1

## 2024-11-12 ASSESSMENT — ENCOUNTER SYMPTOMS
SHORTNESS OF BREATH: 0
DIARRHEA: 0
BACK PAIN: 1
SINUS PRESSURE: 0
ROS SKIN COMMENTS: NO CONCERNING SKIN LESIONS
NAUSEA: 0
VOMITING: 0
COUGH: 0
ABDOMINAL PAIN: 0
CONSTIPATION: 1
BLOOD IN STOOL: 0

## 2024-11-12 NOTE — PROGRESS NOTES
SUBJECTIVE:  Prabha Crawford is a 69 y.o. female being evaluated for:    Chief Complaint   Patient presents with    Medicare AWV HPI   Here for wellness  Continued problems with right side    Hip groin going up to arm   Referred to orthopedist and could not find anything but inflammation Saw pain management and given shots in neck back and hip   Helped a little bit for a very short period of time and PT      Allergies   Allergen Reactions    Seasonal Shortness Of Breath, Itching, Rash and Other (See Comments)     Allergic to scented perfume/shampoo/fabric softener/Mold/trees/grass-    Ciprofloxacin Nausea And Vomiting     Current Outpatient Medications   Medication Sig Dispense Refill    celecoxib (CELEBREX) 200 MG capsule Take 1 capsule by mouth daily 30 capsule 2    levothyroxine (SYNTHROID) 50 MCG tablet TAKE 1 TABLET BY MOUTH DAILY 90 tablet 1    fluticasone (FLONASE) 50 MCG/ACT nasal spray 1 spray by Each Nostril route daily 16 g 2    albuterol sulfate HFA (PROVENTIL;VENTOLIN;PROAIR) 108 (90 Base) MCG/ACT inhaler Inhale 2 puffs into the lungs every 6 hours as needed for Wheezing or Shortness of Breath 1 each 5    montelukast (SINGULAIR) 10 MG tablet Take 1 tablet by mouth nightly 90 tablet 1    mirabegron (MYRBETRIQ) 50 MG TB24 Take 50 mg by mouth daily 30 tablet 3    nystatin (MYCOSTATIN) 315475 units TABS TAKE ONE TABLET BY MOUTH TWICE A  tablet 3    SALINE NASAL SPRAY NA by Nasal route daily      Calcium Carbonate Antacid (TUMS PO) Take by mouth daily as needed      traMADol (ULTRAM) 50 MG tablet Take 1 tablet by mouth as needed.      FIBER ADULT GUMMIES PO Take by mouth as needed      Multiple Vitamins-Minerals (THERAPEUTIC MULTIVITAMIN-MINERALS) tablet Take 1 tablet by mouth daily      Calcium Carbonate-Vitamin D (CALCIUM 600+D3 PO) Take 1 tablet by mouth 2 times daily       Biotin 2500 MCG CAPS Take  by mouth 2 times daily.       No current facility-administered medications for this visit.

## 2024-12-05 ENCOUNTER — TELEPHONE (OUTPATIENT)
Dept: FAMILY MEDICINE CLINIC | Age: 69
End: 2024-12-05

## 2025-01-10 DIAGNOSIS — M15.0 PRIMARY OSTEOARTHRITIS INVOLVING MULTIPLE JOINTS: ICD-10-CM

## 2025-01-10 DIAGNOSIS — Z51.81 MEDICATION MONITORING ENCOUNTER: ICD-10-CM

## 2025-01-10 DIAGNOSIS — I48.92 ATRIAL FLUTTER, UNSPECIFIED TYPE (HCC): ICD-10-CM

## 2025-01-10 LAB
ALBUMIN SERPL-MCNC: 4.3 G/DL (ref 3.4–5)
ALBUMIN/GLOB SERPL: 2.3 {RATIO} (ref 1.1–2.2)
ALP SERPL-CCNC: 101 U/L (ref 40–129)
ALT SERPL-CCNC: 32 U/L (ref 10–40)
ANION GAP SERPL CALCULATED.3IONS-SCNC: 10 MMOL/L (ref 3–16)
AST SERPL-CCNC: 35 U/L (ref 15–37)
BASOPHILS # BLD: 0 K/UL (ref 0–0.2)
BASOPHILS NFR BLD: 0.5 %
BILIRUB SERPL-MCNC: <0.2 MG/DL (ref 0–1)
BUN SERPL-MCNC: 19 MG/DL (ref 7–20)
CALCIUM SERPL-MCNC: 9.8 MG/DL (ref 8.3–10.6)
CHLORIDE SERPL-SCNC: 108 MMOL/L (ref 99–110)
CO2 SERPL-SCNC: 26 MMOL/L (ref 21–32)
CREAT SERPL-MCNC: 0.6 MG/DL (ref 0.6–1.2)
DEPRECATED RDW RBC AUTO: 15 % (ref 12.4–15.4)
EOSINOPHIL # BLD: 0.2 K/UL (ref 0–0.6)
EOSINOPHIL NFR BLD: 3.8 %
GFR SERPLBLD CREATININE-BSD FMLA CKD-EPI: >90 ML/MIN/{1.73_M2}
GLUCOSE SERPL-MCNC: 76 MG/DL (ref 70–99)
HCT VFR BLD AUTO: 40.8 % (ref 36–48)
HGB BLD-MCNC: 13.6 G/DL (ref 12–16)
LYMPHOCYTES # BLD: 2.1 K/UL (ref 1–5.1)
LYMPHOCYTES NFR BLD: 43.7 %
MCH RBC QN AUTO: 30.5 PG (ref 26–34)
MCHC RBC AUTO-ENTMCNC: 33.3 G/DL (ref 31–36)
MCV RBC AUTO: 91.6 FL (ref 80–100)
MONOCYTES # BLD: 0.5 K/UL (ref 0–1.3)
MONOCYTES NFR BLD: 9.5 %
NEUTROPHILS # BLD: 2.1 K/UL (ref 1.7–7.7)
NEUTROPHILS NFR BLD: 42.5 %
PLATELET # BLD AUTO: 235 K/UL (ref 135–450)
PMV BLD AUTO: 9.3 FL (ref 5–10.5)
POTASSIUM SERPL-SCNC: 4.4 MMOL/L (ref 3.5–5.1)
PROT SERPL-MCNC: 6.2 G/DL (ref 6.4–8.2)
RBC # BLD AUTO: 4.45 M/UL (ref 4–5.2)
SODIUM SERPL-SCNC: 144 MMOL/L (ref 136–145)
WBC # BLD AUTO: 4.9 K/UL (ref 4–11)

## 2025-01-31 ENCOUNTER — HOSPITAL ENCOUNTER (OUTPATIENT)
Dept: MAMMOGRAPHY | Age: 70
Discharge: HOME | End: 2025-01-31
Payer: MEDICARE

## 2025-01-31 VITALS — BODY MASS INDEX: 25.19 KG/M2 | HEIGHT: 58 IN | WEIGHT: 120 LBS

## 2025-01-31 DIAGNOSIS — Z12.31 VISIT FOR SCREENING MAMMOGRAM: ICD-10-CM

## 2025-01-31 PROCEDURE — 77067 SCR MAMMO BI INCL CAD: CPT

## 2025-01-31 PROCEDURE — 77063 BREAST TOMOSYNTHESIS BI: CPT

## 2025-02-03 RX ORDER — CELECOXIB 200 MG/1
200 CAPSULE ORAL DAILY
Qty: 30 CAPSULE | Refills: 2 | Status: SHIPPED | OUTPATIENT
Start: 2025-02-03

## 2025-04-03 ENCOUNTER — OFFICE VISIT (OUTPATIENT)
Dept: FAMILY MEDICINE CLINIC | Age: 70
End: 2025-04-03
Payer: MEDICARE

## 2025-04-03 ENCOUNTER — RESULTS FOLLOW-UP (OUTPATIENT)
Dept: FAMILY MEDICINE CLINIC | Age: 70
End: 2025-04-03

## 2025-04-03 VITALS
TEMPERATURE: 98.2 F | WEIGHT: 127.8 LBS | BODY MASS INDEX: 26.83 KG/M2 | OXYGEN SATURATION: 98 % | SYSTOLIC BLOOD PRESSURE: 132 MMHG | RESPIRATION RATE: 18 BRPM | HEIGHT: 58 IN | HEART RATE: 85 BPM | DIASTOLIC BLOOD PRESSURE: 84 MMHG

## 2025-04-03 DIAGNOSIS — I48.92 ATRIAL FLUTTER, UNSPECIFIED TYPE (HCC): ICD-10-CM

## 2025-04-03 DIAGNOSIS — M51.9 LUMBAR DISC DISEASE: ICD-10-CM

## 2025-04-03 DIAGNOSIS — K64.9 HEMORRHOIDS, UNSPECIFIED HEMORRHOID TYPE: ICD-10-CM

## 2025-04-03 DIAGNOSIS — M50.00 CERVICAL DISC DISEASE WITH MYELOPATHY: ICD-10-CM

## 2025-04-03 DIAGNOSIS — R30.0 BURNING WITH URINATION: Primary | ICD-10-CM

## 2025-04-03 LAB
BILIRUBIN, POC: NORMAL
BLOOD URINE, POC: NORMAL
CLARITY, POC: CLEAR
COLOR, POC: YELLOW
GLUCOSE URINE, POC: NORMAL MG/DL
KETONES, POC: NORMAL MG/DL
LEUKOCYTE EST, POC: NORMAL
NITRITE, POC: NORMAL
PH, POC: 5.5
PROTEIN, POC: NORMAL MG/DL
SPECIFIC GRAVITY, POC: <=1.005
UROBILINOGEN, POC: 0.2 MG/DL

## 2025-04-03 PROCEDURE — 81002 URINALYSIS NONAUTO W/O SCOPE: CPT | Performed by: INTERNAL MEDICINE

## 2025-04-03 PROCEDURE — 99214 OFFICE O/P EST MOD 30 MIN: CPT | Performed by: INTERNAL MEDICINE

## 2025-04-03 PROCEDURE — 1159F MED LIST DOCD IN RCRD: CPT | Performed by: INTERNAL MEDICINE

## 2025-04-03 PROCEDURE — 1123F ACP DISCUSS/DSCN MKR DOCD: CPT | Performed by: INTERNAL MEDICINE

## 2025-04-03 RX ORDER — TRAZODONE HYDROCHLORIDE 50 MG/1
50 TABLET ORAL NIGHTLY PRN
Qty: 30 TABLET | Refills: 0 | Status: SHIPPED | OUTPATIENT
Start: 2025-04-03

## 2025-04-03 SDOH — ECONOMIC STABILITY: FOOD INSECURITY: WITHIN THE PAST 12 MONTHS, THE FOOD YOU BOUGHT JUST DIDN'T LAST AND YOU DIDN'T HAVE MONEY TO GET MORE.: NEVER TRUE

## 2025-04-03 SDOH — ECONOMIC STABILITY: FOOD INSECURITY: WITHIN THE PAST 12 MONTHS, YOU WORRIED THAT YOUR FOOD WOULD RUN OUT BEFORE YOU GOT MONEY TO BUY MORE.: NEVER TRUE

## 2025-04-03 ASSESSMENT — PATIENT HEALTH QUESTIONNAIRE - PHQ9
SUM OF ALL RESPONSES TO PHQ QUESTIONS 1-9: 0
1. LITTLE INTEREST OR PLEASURE IN DOING THINGS: NOT AT ALL
SUM OF ALL RESPONSES TO PHQ QUESTIONS 1-9: 0
2. FEELING DOWN, DEPRESSED OR HOPELESS: NOT AT ALL
SUM OF ALL RESPONSES TO PHQ QUESTIONS 1-9: 0
SUM OF ALL RESPONSES TO PHQ QUESTIONS 1-9: 0

## 2025-04-03 NOTE — PROGRESS NOTES
clear; Neck: Tenderness along the cervical spine, especially at the upper levels.4  pulse 85 temp 98.2  weight 127 #  BMI 26.48  oxygen 98%  Respiratory: Clear to auscultation, no wheezing, rales or rhonchi.  Cardiovascular: Regular rate and rhythm, no murmurs, rubs, or gallops.  Gastrointestinal: Soft, no tenderness, no distention, no masses.  Back, Musculoskeletal: Pain over the left supraspinatus and down the shoulder blade area, discomfort in the anterior clavicle area and down into the shoulder, tingling sensation upon touch. NO pain across low back   Extremities: No edema, no cyanosis.  Skin: Warm and dry, no rash.  Neurological: Normal.Equal strength and sensation of all 4 extremities and normal plantar reflexes              The patient (or guardian, if applicable) and other individuals in attendance with the patient were advised that Artificial Intelligence will be utilized during this visit to record, process the conversation to generate a clinical note, and support improvement of the AI technology. The patient (or guardian, if applicable) and other individuals in attendance at the appointment consented to the use of AI, including the recording.      An electronic signature was used to authenticate this note.    --YUMIKO GOTTLIEB MD

## 2025-04-05 LAB
BACTERIA UR CULT: ABNORMAL
ORGANISM: ABNORMAL

## 2025-04-06 RX ORDER — NITROFURANTOIN 25; 75 MG/1; MG/1
100 CAPSULE ORAL 2 TIMES DAILY
Qty: 10 CAPSULE | Refills: 0 | Status: SHIPPED | OUTPATIENT
Start: 2025-04-06 | End: 2025-04-11

## 2025-04-18 ENCOUNTER — OFFICE VISIT (OUTPATIENT)
Dept: SURGERY | Age: 70
End: 2025-04-18
Payer: MEDICARE

## 2025-04-18 VITALS
HEIGHT: 58 IN | HEART RATE: 92 BPM | BODY MASS INDEX: 26.24 KG/M2 | SYSTOLIC BLOOD PRESSURE: 117 MMHG | TEMPERATURE: 97.8 F | WEIGHT: 125 LBS | DIASTOLIC BLOOD PRESSURE: 82 MMHG | OXYGEN SATURATION: 98 %

## 2025-04-18 DIAGNOSIS — K64.1 GRADE II HEMORRHOIDS: Primary | ICD-10-CM

## 2025-04-18 PROCEDURE — 1123F ACP DISCUSS/DSCN MKR DOCD: CPT | Performed by: SURGERY

## 2025-04-18 PROCEDURE — 99203 OFFICE O/P NEW LOW 30 MIN: CPT | Performed by: SURGERY

## 2025-04-18 PROCEDURE — 1159F MED LIST DOCD IN RCRD: CPT | Performed by: SURGERY

## 2025-04-18 NOTE — PROGRESS NOTES
[facility] SPECIAL[facility] [place] [facility]  [address]  [address]   [facility] [address]  Dept: [phone]  Dept Fax: [phone]  Loc: [phone]    Visit Date: 2025    Prabha Crawford is a 69 y.o. female who presents today for: New Patient (Hemorrhoids )      HPI:       Prabha Crawford is a 69 y.o. female referred by Dr. Avila for hemorrhoids.    Patient has had several months of hemorrhoids symptoms.   Main complaints include: bleeding, prolapse  Previous treatment: yes - banding of \"external hemorrhoid\" in past    Past Medical History:   Diagnosis Date    Allergic rhinitis     Asthma     Atrial flutter (HCC)     Atrial septal defect     Cervical disc disease     Constipation     Environmental allergies     pt allergic to scents:  perfume, soap, fabric softener    GERD (gastroesophageal reflux disease)     H/O degenerative disc disease     neck    Hypothyroidism     Lumbar disc disease     OAB (overactive bladder)     Osteoarthritis     left hip    Pacemaker 2019    medtronic model # [id number]    SSS (sick sinus syndrome) (HCC)     Thoracic disc disease        Social History:   Social History     Tobacco Use    Smoking status: Former     Current packs/day: 0.00     Average packs/day: 0.5 packs/day for 8.0 years (4.0 ttl pk-yrs)     Types: Cigarettes     Start date: 1972     Quit date: 1980     Years since quittin.3    Smokeless tobacco: Never   Substance Use Topics    Alcohol use: Yes     Comment: rare, occ glass wine      Tobacco cessation counseling provided as appropriate.    Date of last Colonoscopy: 2024   No cologuard on file  No FIT/FOBT on file   No flexible sigmoidoscopy on file    Objective:     Physical Exam   /82   Pulse 92   Temp 97.8 °F (36.6 °C) (Oral)   Ht 1.473 m (4' 10\")   Wt 56.7 kg (125 lb)   LMP  (LMP Unknown)   SpO2 98%   BMI 26.13 kg/m²   Constitutional: Appears well-developed

## 2025-04-24 ENCOUNTER — OFFICE VISIT (OUTPATIENT)
Dept: SURGERY | Age: 70
End: 2025-04-24
Payer: MEDICARE

## 2025-04-24 VITALS
HEIGHT: 58 IN | WEIGHT: 123 LBS | BODY MASS INDEX: 25.82 KG/M2 | DIASTOLIC BLOOD PRESSURE: 82 MMHG | SYSTOLIC BLOOD PRESSURE: 122 MMHG | TEMPERATURE: 98 F | HEART RATE: 92 BPM | OXYGEN SATURATION: 98 %

## 2025-04-24 DIAGNOSIS — K64.1 GRADE II HEMORRHOIDS: Primary | ICD-10-CM

## 2025-04-24 PROCEDURE — 46221 LIGATION OF HEMORRHOID(S): CPT | Performed by: SURGERY

## 2025-04-24 NOTE — PROGRESS NOTES
INTERNAL HEMORRHOID RUBBER BAND LIGATION PROCEDURE NOTE:    Patient presented with symptomatic internal hemorrhoids unresponsive to conservative management. See previous office notes for details of previous history and treatments.     Risks of rubber band ligation explained to patient, including but not limited to: immediate and delayed bleeding, pain, infection, external hemorrhoids thrombosis, pelvic sepsis, urinary retention, sphincter dysfunction, need for additional procedures, and recurrence. Patient was previously provided with information in office AVS (after visit summary) and given opportunity to ask any questions and bring up any concerns.     Chaperone/MA present in room during entire procedure.    Patient was placed in either lateral or knee-chest positioning depending on patient preference. Exam table manipulated for proper visualization and lighting. Buttocks spread. Digital exam and anoscopy performed confirming internal hemorrhoids.    Suction rubber band ligator used to place band in 3 positions, 1 cm proximal to the dentate line, at the apex of the internal hemorrhoid.    Anoscope removed. Patient tolerated the procedure well without complication. EBL minimal. Post procedure expectations and instructions explained to patient. Written instructions provided as well.    Disposition: Follow up in 4 weeks for symptom reassessment.    Electronically signed by Elier Laguna MD on 4/24/2025 at 11:17 AM

## 2025-04-28 RX ORDER — TRAZODONE HYDROCHLORIDE 50 MG/1
TABLET ORAL
Qty: 30 TABLET | Refills: 0 | Status: SHIPPED | OUTPATIENT
Start: 2025-04-28

## 2025-05-03 DIAGNOSIS — J45.20 MILD INTERMITTENT ASTHMA WITHOUT COMPLICATION: ICD-10-CM

## 2025-05-05 ENCOUNTER — TRANSCRIBE ORDERS (OUTPATIENT)
Dept: ADMINISTRATIVE | Age: 70
End: 2025-05-05

## 2025-05-05 DIAGNOSIS — M25.512 LEFT SHOULDER PAIN, UNSPECIFIED CHRONICITY: ICD-10-CM

## 2025-05-05 DIAGNOSIS — M25.552 HIP PAIN, LEFT: Primary | ICD-10-CM

## 2025-05-05 DIAGNOSIS — M79.605 LEG PAIN, LEFT: ICD-10-CM

## 2025-05-05 DIAGNOSIS — M79.602 ARM PAIN, LEFT: ICD-10-CM

## 2025-05-05 DIAGNOSIS — R10.32 GROIN PAIN, LEFT: ICD-10-CM

## 2025-05-05 DIAGNOSIS — M79.642 HAND PAIN, LEFT: ICD-10-CM

## 2025-05-05 RX ORDER — MIRABEGRON 50 MG/1
50 TABLET, FILM COATED, EXTENDED RELEASE ORAL DAILY
Qty: 30 TABLET | Refills: 3 | Status: SHIPPED | OUTPATIENT
Start: 2025-05-05

## 2025-05-05 RX ORDER — CELECOXIB 200 MG/1
200 CAPSULE ORAL DAILY
Qty: 30 CAPSULE | Refills: 2 | Status: SHIPPED | OUTPATIENT
Start: 2025-05-05

## 2025-05-05 RX ORDER — MONTELUKAST SODIUM 10 MG/1
10 TABLET ORAL NIGHTLY
Qty: 90 TABLET | Refills: 1 | Status: SHIPPED | OUTPATIENT
Start: 2025-05-05

## 2025-05-14 ENCOUNTER — HOSPITAL ENCOUNTER (OUTPATIENT)
Dept: INTERVENTIONAL RADIOLOGY/VASCULAR | Age: 70
Discharge: HOME | End: 2025-05-14
Payer: MEDICARE

## 2025-05-14 ENCOUNTER — HOSPITAL ENCOUNTER (OUTPATIENT)
Dept: CT IMAGING | Age: 70
Discharge: HOME | End: 2025-05-14
Payer: MEDICARE

## 2025-05-14 DIAGNOSIS — M79.642 HAND PAIN, LEFT: ICD-10-CM

## 2025-05-14 DIAGNOSIS — M25.552 HIP PAIN, LEFT: ICD-10-CM

## 2025-05-14 DIAGNOSIS — M79.605 LEG PAIN, LEFT: ICD-10-CM

## 2025-05-14 DIAGNOSIS — M25.512 LEFT SHOULDER PAIN, UNSPECIFIED CHRONICITY: ICD-10-CM

## 2025-05-14 DIAGNOSIS — R10.32 GROIN PAIN, LEFT: ICD-10-CM

## 2025-05-14 DIAGNOSIS — R10.32 LEFT GROIN PAIN: Primary | ICD-10-CM

## 2025-05-14 DIAGNOSIS — M79.602 ARM PAIN, LEFT: ICD-10-CM

## 2025-05-14 LAB
INR PPP: 0.95 (ref 0.85–1.15)
PLATELET # BLD AUTO: 271 K/UL (ref 135–450)
PROTHROMBIN TIME: 12.9 SEC (ref 11.9–14.9)

## 2025-05-14 PROCEDURE — 36415 COLL VENOUS BLD VENIPUNCTURE: CPT

## 2025-05-14 PROCEDURE — 85049 AUTOMATED PLATELET COUNT: CPT

## 2025-05-14 PROCEDURE — 6360000004 HC RX CONTRAST MEDICATION: Performed by: INTERNAL MEDICINE

## 2025-05-14 PROCEDURE — 2709999900 FL MYELOGRAM LUMBOSACRAL S&I

## 2025-05-14 PROCEDURE — 72132 CT LUMBAR SPINE W/DYE: CPT

## 2025-05-14 PROCEDURE — 85610 PROTHROMBIN TIME: CPT

## 2025-05-14 PROCEDURE — 72126 CT NECK SPINE W/DYE: CPT

## 2025-05-14 RX ORDER — IOPAMIDOL 612 MG/ML
15 INJECTION, SOLUTION INTRATHECAL
Status: COMPLETED | OUTPATIENT
Start: 2025-05-14 | End: 2025-05-14

## 2025-05-14 RX ADMIN — IOPAMIDOL 10 ML: 612 INJECTION, SOLUTION INTRATHECAL at 13:57

## 2025-05-14 NOTE — DISCHARGE INSTRUCTIONS
INTERVENTIONAL RADIOLOGY DEPARTMENT  [facility]  [address], [place] [address]  Telephone: [phone]      PATIENT NAME: Prabha Crawford  MEDICAL RECORD NUMBER:  [id number]  TODAY'S DATE: @ED@      POST MYELOGRAM DISCHARGE INSTRUCTIONS    1.  If you develop a severe headache, not relieved with over the counter pain medication, contact Anne-Marie Venegas APRN - CNP  .    2.  You may remove the dressing from your back in the morning.    3.  It is ok to shower starting today, but do not submerge in water (ie pool, bath tub, etc) for four days.    4.  If you develop a fever, bleeding or drainage from the site, contact Anne-Marie Venegas APRN - CNP  .    5.  Try to drink 6-8 glasses of water a day for the next two days, this will help flush the contrast from your body.    6.  Please follow up with Anne-Marie Venegas APRN - CNP  for your results from this study.

## 2025-05-19 ENCOUNTER — HOSPITAL ENCOUNTER (OUTPATIENT)
Age: 70
Discharge: HOME | End: 2025-05-19
Payer: MEDICARE

## 2025-05-19 ENCOUNTER — HOSPITAL ENCOUNTER (OUTPATIENT)
Dept: GENERAL RADIOLOGY | Age: 70
Discharge: HOME | End: 2025-05-19
Attending: STUDENT IN AN ORGANIZED HEALTH CARE EDUCATION/TRAINING PROGRAM
Payer: MEDICARE

## 2025-05-19 PROCEDURE — 73502 X-RAY EXAM HIP UNI 2-3 VIEWS: CPT

## 2025-05-27 RX ORDER — TRAZODONE HYDROCHLORIDE 50 MG/1
TABLET ORAL
Qty: 30 TABLET | Refills: 2 | Status: SHIPPED | OUTPATIENT
Start: 2025-05-27 | End: 2025-08-25

## 2025-06-12 ENCOUNTER — HOSPITAL ENCOUNTER (OUTPATIENT)
Age: 70
Discharge: HOME | End: 2025-06-12
Payer: MEDICARE

## 2025-06-12 ENCOUNTER — HOSPITAL ENCOUNTER (OUTPATIENT)
Dept: GENERAL RADIOLOGY | Age: 70
Discharge: HOME | End: 2025-06-12
Attending: STUDENT IN AN ORGANIZED HEALTH CARE EDUCATION/TRAINING PROGRAM
Payer: MEDICARE

## 2025-06-12 ENCOUNTER — OFFICE VISIT (OUTPATIENT)
Dept: SURGERY | Age: 70
End: 2025-06-12
Payer: MEDICARE

## 2025-06-12 VITALS
HEIGHT: 58 IN | TEMPERATURE: 97.5 F | DIASTOLIC BLOOD PRESSURE: 83 MMHG | WEIGHT: 120 LBS | SYSTOLIC BLOOD PRESSURE: 154 MMHG | HEART RATE: 90 BPM | BODY MASS INDEX: 25.19 KG/M2

## 2025-06-12 DIAGNOSIS — K64.1 GRADE II HEMORRHOIDS: Primary | ICD-10-CM

## 2025-06-12 PROCEDURE — 72110 X-RAY EXAM L-2 SPINE 4/>VWS: CPT

## 2025-06-12 PROCEDURE — 46221 LIGATION OF HEMORRHOID(S): CPT | Performed by: SURGERY

## 2025-06-12 PROCEDURE — 72050 X-RAY EXAM NECK SPINE 4/5VWS: CPT

## 2025-06-12 NOTE — PROGRESS NOTES
INTERNAL HEMORRHOID RUBBER BAND LIGATION PROCEDURE NOTE:    Patient presented with symptomatic internal hemorrhoids unresponsive to conservative management. See previous office notes for details of previous history and treatments.     Risks of rubber band ligation explained to patient, including but not limited to: immediate and delayed bleeding, pain, infection, external hemorrhoids thrombosis, pelvic sepsis, urinary retention, sphincter dysfunction, need for additional procedures, and recurrence. Patient was previously provided with information in office AVS (after visit summary) and given opportunity to ask any questions and bring up any concerns.     Chaperone/MA present in room during entire procedure.    Patient was placed in either lateral or knee-chest positioning depending on patient preference. Exam table manipulated for proper visualization and lighting. Buttocks spread. Digital exam and anoscopy performed confirming internal hemorrhoids.    Suction rubber band ligator used to place band in 2 positions, 1 cm proximal to the dentate line, at the apex of the internal hemorrhoid.    Anoscope removed. Patient tolerated the procedure well without complication. EBL minimal. Post procedure expectations and instructions explained to patient. Written instructions provided as well.    Disposition: Follow up in 4 weeks for symptom reassessment.    Electronically signed by Elier Laguna MD on 6/12/2025 at 10:10 AM

## 2025-06-12 NOTE — PATIENT INSTRUCTIONS
procedure.  Dr Laguna's office phone # is [phone]  If you are unable to reach the office (outside of normal business hours) and you have any concerns, go to your nearest emergency room.

## 2025-07-15 ENCOUNTER — TRANSCRIBE ORDERS (OUTPATIENT)
Dept: ADMINISTRATIVE | Age: 70
End: 2025-07-15

## 2025-07-15 DIAGNOSIS — M25.552 LEFT HIP PAIN: Primary | ICD-10-CM

## 2025-07-31 ENCOUNTER — HOSPITAL ENCOUNTER (OUTPATIENT)
Dept: NUCLEAR MEDICINE | Age: 70
Discharge: HOME | End: 2025-07-31
Attending: STUDENT IN AN ORGANIZED HEALTH CARE EDUCATION/TRAINING PROGRAM
Payer: MEDICARE

## 2025-07-31 DIAGNOSIS — M25.552 LEFT HIP PAIN: ICD-10-CM

## 2025-07-31 PROCEDURE — 3430000000 HC RX DIAGNOSTIC RADIOPHARMACEUTICAL: Performed by: STUDENT IN AN ORGANIZED HEALTH CARE EDUCATION/TRAINING PROGRAM

## 2025-07-31 PROCEDURE — 78306 BONE IMAGING WHOLE BODY: CPT | Performed by: STUDENT IN AN ORGANIZED HEALTH CARE EDUCATION/TRAINING PROGRAM

## 2025-07-31 PROCEDURE — A9503 TC99M MEDRONATE: HCPCS | Performed by: STUDENT IN AN ORGANIZED HEALTH CARE EDUCATION/TRAINING PROGRAM

## 2025-07-31 RX ORDER — TC 99M MEDRONATE 20 MG/10ML
25 INJECTION, POWDER, LYOPHILIZED, FOR SOLUTION INTRAVENOUS
Status: COMPLETED | OUTPATIENT
Start: 2025-07-31 | End: 2025-07-31

## 2025-07-31 RX ADMIN — TC 99M MEDRONATE 25 MILLICURIE: 20 INJECTION, POWDER, LYOPHILIZED, FOR SOLUTION INTRAVENOUS at 09:26

## 2025-08-07 RX ORDER — CELECOXIB 200 MG/1
200 CAPSULE ORAL DAILY
Qty: 30 CAPSULE | Refills: 2 | Status: SHIPPED | OUTPATIENT
Start: 2025-08-07

## (undated) DEVICE — MEDIA CONTRAST RX ISOVUE-300 61% 30ML VIALS

## (undated) DEVICE — NDL,TUOHY EPID,22GX3.5",METAL STYLET: Brand: MEDLINE

## (undated) DEVICE — BANDAGE COMPR W2INXL5YD TAN BRTH SELF ADH WRP W/ HND TEAR

## (undated) DEVICE — WRAP COHESIVE W2INXL5YD TAN SELF ADH BNDG HND NON STERILE TEAR CARING

## (undated) DEVICE — GLOVE ORANGE PI 8   MSG9080

## (undated) DEVICE — SHEET,DRAPE,53X77,STERILE: Brand: MEDLINE

## (undated) DEVICE — BANDAGE COMPR W4INXL15FT BGE E SGL LAYERED CLP CLSR

## (undated) DEVICE — STERILE POLYISOPRENE POWDER-FREE SURGICAL GLOVES: Brand: PROTEXIS

## (undated) DEVICE — SYRINGE MED 10ML LUERLOCK TIP W/O SFTY DISP

## (undated) DEVICE — Device: Brand: JELCO

## (undated) DEVICE — HYPODERMIC SAFETY NEEDLE: Brand: MAGELLAN

## (undated) DEVICE — CHLORAPREP 26ML ORANGE

## (undated) DEVICE — SUTURE VCRL + SZ 3-0 L27IN ABSRB UD L26MM SH 1/2 CIR VCP416H

## (undated) DEVICE — 7496-8Z MINI-PLUS KIT: Brand: DEVON

## (undated) DEVICE — PADDING UNDERCAST W4INXL4YD 100% COT CRIMPED FINISH WBRL II

## (undated) DEVICE — STANDARD HYPODERMIC NEEDLE,POLYPROPYLENE HUB: Brand: MONOJECT

## (undated) DEVICE — PORT VLV 2 W NDL FREE SMRTSITE

## (undated) DEVICE — WILLIS PACK: Brand: MEDLINE INDUSTRIES, INC.

## (undated) DEVICE — NEEDLE SPNL 25GA L3.5IN BLU HUB S STL REG WALL FIT STYL W/

## (undated) DEVICE — NEEDLE SPNL 22GA L3.5IN BLK HUB S STL REG WALL FIT STYL W/

## (undated) DEVICE — SOLUTION IV IRRIG 250ML ST LF 0.9% SODIUM 2F7122